# Patient Record
Sex: FEMALE | Race: WHITE | NOT HISPANIC OR LATINO | Employment: FULL TIME | URBAN - METROPOLITAN AREA
[De-identification: names, ages, dates, MRNs, and addresses within clinical notes are randomized per-mention and may not be internally consistent; named-entity substitution may affect disease eponyms.]

---

## 2017-06-08 ENCOUNTER — TELEPHONE (OUTPATIENT)
Dept: ORTHOPEDICS | Facility: CLINIC | Age: 47
End: 2017-06-08

## 2017-06-08 DIAGNOSIS — M54.2 CERVICAL SPINE PAIN: Primary | ICD-10-CM

## 2017-06-14 ENCOUNTER — OFFICE VISIT (OUTPATIENT)
Dept: ORTHOPEDICS | Facility: CLINIC | Age: 47
End: 2017-06-14

## 2017-06-14 ENCOUNTER — HOSPITAL ENCOUNTER (OUTPATIENT)
Dept: RADIOLOGY | Facility: HOSPITAL | Age: 47
Discharge: HOME | End: 2017-06-14
Attending: ORTHOPAEDIC SURGERY

## 2017-06-14 VITALS — HEIGHT: 60 IN | WEIGHT: 115.44 LBS | BODY MASS INDEX: 22.66 KG/M2

## 2017-06-14 DIAGNOSIS — M54.2 CERVICAL SPINE PAIN: ICD-10-CM

## 2017-06-14 DIAGNOSIS — M54.12 CERVICAL RADICULOPATHY: Primary | ICD-10-CM

## 2017-06-14 PROCEDURE — 72050 X-RAY EXAM NECK SPINE 4/5VWS: CPT | Mod: 26,,, | Performed by: RADIOLOGY

## 2017-06-14 PROCEDURE — 99204 OFFICE O/P NEW MOD 45 MIN: CPT | Mod: S$PBB,,, | Performed by: ORTHOPAEDIC SURGERY

## 2017-06-14 PROCEDURE — 99213 OFFICE O/P EST LOW 20 MIN: CPT | Mod: PBBFAC,25 | Performed by: ORTHOPAEDIC SURGERY

## 2017-06-14 PROCEDURE — 72050 X-RAY EXAM NECK SPINE 4/5VWS: CPT | Mod: TC

## 2017-06-14 PROCEDURE — 99999 PR PBB SHADOW E&M-EST. PATIENT-LVL III: CPT | Mod: PBBFAC,,, | Performed by: ORTHOPAEDIC SURGERY

## 2017-06-14 RX ORDER — TRAMADOL HYDROCHLORIDE 50 MG/1
50 TABLET ORAL EVERY 6 HOURS PRN
COMMUNITY
End: 2017-07-12

## 2017-06-14 RX ORDER — MULTIVITAMIN
1 TABLET ORAL DAILY
Status: ON HOLD | COMMUNITY
End: 2017-08-03 | Stop reason: HOSPADM

## 2017-06-14 RX ORDER — PROGESTERONE 200 MG/1
200 CAPSULE ORAL NIGHTLY
COMMUNITY

## 2017-06-16 NOTE — PROGRESS NOTES
DATE: 6/16/2017  PATIENT: Sammie Bee    Attending Physician: Kvng Whittaker M.D.    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 The pain has been present for about 3 years, she awoke one morning with a stiff neck and has been dealing with this ever since. The patient describes the pain as stabbing pain. The pain is worse with driving and improved by hot baths. There is no associated numbness and tingling. There is subjective weakness: specifically she reports difficulty with jars. Prior treatments have included NSAIDs, tramadol, dry needling, massage and chiropractic acare, but no injections or surgery.     The Patient denies myelopathic symptoms such as handwriting changes or difficulty with buttons/coins/keys. Denies perineal paresthesias, bowel/bladder dysfunction.    PAST MEDICAL/SURGICAL HISTORY:  History reviewed. No pertinent past medical history.  History reviewed. No pertinent surgical history.    Current Medications:   Current Outpatient Prescriptions:     ESTROGENS, CONJUGATED (PREMARIN ORAL), Take by mouth., Disp: , Rfl:     multivitamin (ONE DAILY MULTIVITAMIN) per tablet, Take 1 tablet by mouth once daily., Disp: , Rfl:     progesterone (PROMETRIUM) 200 MG capsule, Take 200 mg by mouth once daily., Disp: , Rfl:     tramadol (ULTRAM) 50 mg tablet, Take 50 mg by mouth every 6 (six) hours as needed for Pain., Disp: , Rfl:     Social History:   Social History     Social History    Marital status:      Spouse name: N/A    Number of children: N/A    Years of education: N/A     Occupational History    Not on file.     Social History Main Topics    Smoking status: Not on file    Smokeless tobacco: Not on file    Alcohol use Not on file    Drug use: Unknown    Sexual activity:  Not on file     Other Topics Concern    Not on file     Social History Narrative    No narrative on file       REVIEW OF SYSTEMS:  Constitution: Negative. Negative for chills, fever and night sweats.   Cardiovascular: Negative for chest pain and syncope.   Respiratory: Negative for cough and shortness of breath.   Gastrointestinal: See HPI. Negative for nausea/vomiting.   Genitourinary: See HPI. Negative for discoloration or dysuria.  Skin: Negative for dry skin, itching and rash.   Hematologic/Lymphatic: Negative for bleeding/clotting disorders.   Musculoskeletal: Negative for falls and muscle weakness.   Neurological: See HPI. + For headaches, no history of seizures. no history of cranial surgery or shunts.  Endocrine: Negative for polydipsia, polyphagia and polyuria.   Allergic/Immunologic: Negative for hives and persistent infections.  Psychiatric/Behavioral: Negative for depression and insomnia.         EXAM:  Ht 5' (1.524 m)   Wt 52.3 kg (115 lb 6.6 oz)   BMI 22.54 kg/m²     General: The patient is a very pleasant 47 y.o. female in no apparent distress, the patient is orientatied to person, place and time.  Psych: Normal mood and affect  HEENT: Vision grossly intact, hearing intact to the spoken word.  Lungs: Respirations unlabored.  Gait: Normal station and gait, no difficulty with toe or heel walk.   Skin: Cervical skin negative for rashes, lesions, hairy patches and surgical scars.  Range of motion: Cervical range of motion is acceptable. There is 2+ tenderness to palpation.  Spinal Balance: Global saggital and coronal spinal balance acceptable, no significant for scoliosis and kyphosis.  Musculoskeletal: No pain with the range of motion of the bilateral shoulders and elbows. Normal bulk and contour of the bilateral hands.  Vascular: Bilateral hands warm and well perfused, Radial pulses 2+ bilaterally.  Neurological: Normal strength and tone in all major motor groups in the bilateral upper and lower  extremities. Normal sensation to light touch in the C5-T1 and L2-S1 dermatomes bilaterally.  Deep tendon reflexes symmetric 2+ in the bilateral upper and lower extremities.  Positive Bilateral Inverted Radial Reflex and Negative Sampson's bilaterally. Negative Babinski bilaterally.     IMAGING:   Today I personally reviewed AP, Lat and Flex/Ex  upright C-spine films that demonstrate focal C5/6 spondylosis     ASSESSMENT/PLAN:  Cervical radiculopathy  -     MRI Cervical Spine Without Contrast; Future; Expected date: 06/14/2017      RTC to discuss options.

## 2017-06-23 ENCOUNTER — OFFICE VISIT (OUTPATIENT)
Dept: ORTHOPEDICS | Facility: CLINIC | Age: 47
End: 2017-06-23

## 2017-06-23 VITALS — WEIGHT: 102.63 LBS | BODY MASS INDEX: 20.15 KG/M2 | HEIGHT: 60 IN

## 2017-06-23 DIAGNOSIS — M54.12 CERVICAL RADICULOPATHY: Primary | ICD-10-CM

## 2017-06-23 PROCEDURE — 99213 OFFICE O/P EST LOW 20 MIN: CPT | Mod: S$PBB,,, | Performed by: ORTHOPAEDIC SURGERY

## 2017-06-23 PROCEDURE — 99999 PR PBB SHADOW E&M-EST. PATIENT-LVL III: CPT | Mod: PBBFAC,,, | Performed by: ORTHOPAEDIC SURGERY

## 2017-06-23 PROCEDURE — 99213 OFFICE O/P EST LOW 20 MIN: CPT | Mod: PBBFAC | Performed by: ORTHOPAEDIC SURGERY

## 2017-06-23 RX ORDER — HYDROCODONE BITARTRATE AND ACETAMINOPHEN 5; 325 MG/1; MG/1
1 TABLET ORAL NIGHTLY PRN
Qty: 60 TABLET | Refills: 0 | Status: SHIPPED | OUTPATIENT
Start: 2017-06-23 | End: 2017-07-10 | Stop reason: SDUPTHER

## 2017-06-23 RX ORDER — MUPIROCIN 20 MG/G
1 OINTMENT TOPICAL
Status: CANCELLED | OUTPATIENT
Start: 2017-06-23

## 2017-06-23 NOTE — PROGRESS NOTES
DATE: 6/23/2017  PATIENT: Sammie Bee    Attending Physician: Kvng Whittaker M.D.    Sammie Bee is a 47 y.o. female who returns to me today to follow up for neck and right shoulder/arm pain.  She has continued to have worsening of her pains and increasing right arm/hand weakness.  She had a MRI since her last visit which revealed a R sided disc bulge and significant R sided C5/6 stenosis.    We discussed her past treatments, which have included PT with dry needling and ESIs, all of which have failed to provide relief.    Given the progression of her symptoms and failure of conservative treatment, we will go ahead and pursue operative intervention.  We will plan on C5/6 arthroplasty versus possible ACDF on August 3, 2017.

## 2017-07-10 DIAGNOSIS — Z98.890 S/P SPINAL SURGERY: Primary | ICD-10-CM

## 2017-07-10 RX ORDER — HYDROCODONE BITARTRATE AND ACETAMINOPHEN 5; 325 MG/1; MG/1
1 TABLET ORAL NIGHTLY PRN
Qty: 60 TABLET | Refills: 0 | Status: SHIPPED | OUTPATIENT
Start: 2017-07-10 | End: 2017-07-12 | Stop reason: SDUPTHER

## 2017-07-12 DIAGNOSIS — Z98.890 S/P SPINAL SURGERY: ICD-10-CM

## 2017-07-12 RX ORDER — HYDROCODONE BITARTRATE AND ACETAMINOPHEN 5; 325 MG/1; MG/1
1 TABLET ORAL EVERY 6 HOURS PRN
Qty: 60 TABLET | Refills: 0 | Status: ON HOLD | OUTPATIENT
Start: 2017-07-12 | End: 2017-08-03 | Stop reason: HOSPADM

## 2017-07-20 DIAGNOSIS — Z98.890 S/P SPINAL SURGERY: Primary | ICD-10-CM

## 2017-07-26 ENCOUNTER — ANESTHESIA EVENT (OUTPATIENT)
Dept: SURGERY | Facility: HOSPITAL | Age: 47
End: 2017-07-26

## 2017-07-26 NOTE — ANESTHESIA PREPROCEDURE EVALUATION
Anesthesia Assessment: Preoperative EQUATION    Planned Procedure: Procedure(s) (LRB):  C5/6 Disc Replacement LDR SNS: Motors/SSEP/Vocal Cord Regular OR Table C-arm (N/A)  Requested Anesthesia Type:General  Surgeon: Kvng Whittaker MD  Service: Neurosurgery  Known or anticipated Date of Surgery:8/3/2017    Surgeon notes: reviewed  Triage considerations:     The patient has no apparent active cardiac condition (No unstable coronary Syndrome such as severe unstable angina or recent [<1 month] myocardial infarction, decompensated CHF, severe valvular   disease or significant arrhythmia)    Previous anesthesia records:Not available    Last PCP note: within 3 months , outside [facility]     Other important co-morbidities: none     Tests already available:  No recent tests.            Instructions given. (See in Nurse's note)    Optimization:  Anesthesia Preop Clinic Assessment not Indicated  Plan:    Testing:  Hematology Profile, BMP and PT/INR 7/31 complete              Straight Line to surgery.               No tests, anesthesia preop clinic visit, or consult required.          MOISE FOOTE 7/26/17 07/26/2017  Sammie Bee is a 47 y.o., female.    Anesthesia Evaluation    I have reviewed the Patient Summary Reports.    I have reviewed the Nursing Notes.   I have reviewed the Medications.     Review of Systems  Anesthesia Hx:  No problems with previous Anesthesia  History of prior surgery of interest to airway management or planning: Previous anesthesia: General  Denies Personal Hx of Anesthesia complications.   Cardiovascular:  Cardiovascular Normal     Pulmonary:  Pulmonary Normal    Renal/:  Renal/ Normal     Hepatic/GI:  Hepatic/GI Normal    Neurological:   Neuromuscular Disease,    Endocrine:  Endocrine Normal      Patient Active Problem List   Diagnosis    Cervical radiculopathy     History reviewed.  No pertinent past medical history.  History reviewed. No pertinent surgical history.      Physical Exam  General:  Well nourished    Airway/Jaw/Neck:  Airway Findings: Mouth Opening: Normal Tongue: Normal  General Airway Assessment: Adult  Mallampati: II  TM Distance: Normal, at least 6 cm  Jaw/Neck Findings:  Neck ROM: Normal ROM      Dental:  Dental Findings: In tact   Chest/Lungs:  Chest/Lungs Findings: Clear to auscultation     Heart/Vascular:  Heart Findings: Rate: Normal  Rhythm: Regular Rhythm  Sounds: Normal        Mental Status:  Mental Status Findings:  Cooperative, Alert and Oriented         Anesthesia Plan  Type of Anesthesia, risks & benefits discussed:  Anesthesia Type:  general  Patient's Preference: general  Intra-op Monitoring Plan: standard ASA monitors  Intra-op Monitoring Plan Comments:   Post Op Pain Control Plan: per primary service following discharge from PACU  Post Op Pain Control Plan Comments:   Induction:   IV  Beta Blocker:  Patient is not currently on a Beta-Blocker (No further documentation required).       Informed Consent: Patient understands risks and agrees with Anesthesia plan.  Questions answered. Anesthesia consent signed with patient.  ASA Score: 1     Day of Surgery Review of History & Physical:    H&P update referred to the surgeon.         Ready For Surgery From Anesthesia Perspective.

## 2017-07-26 NOTE — PRE ADMISSION SCREENING
Anesthesia Assessment: Preoperative EQUATION    Planned Procedure: Procedure(s) (LRB):  C5/6 Disc Replacement LDR SNS: Motors/SSEP/Vocal Cord Regular OR Table C-arm (N/A)  Requested Anesthesia Type:General  Surgeon: Kvng Whittaker MD  Service: Neurosurgery  Known or anticipated Date of Surgery:8/3/2017    Surgeon notes: reviewed  Triage considerations:     The patient has no apparent active cardiac condition (No unstable coronary Syndrome such as severe unstable angina or recent [<1 month] myocardial infarction, decompensated CHF, severe valvular   disease or significant arrhythmia)    Previous anesthesia records:Not available    Last PCP note: within 3 months , outside [facility]       Other important co-morbidities:      Tests already available:  No recent tests.            Instructions given. (See in Nurse's note)    Optimization:  Anesthesia Preop Clinic Assessment  Indicated  Plan:    Testing:  Hematology Profile, BMP and PT/INR 7/31              Straight Line to surgery.               No tests(7/31), anesthesia preop clinic visit, or consult required.

## 2017-07-31 ENCOUNTER — OFFICE VISIT (OUTPATIENT)
Dept: SPINE | Facility: CLINIC | Age: 47
End: 2017-07-31
Attending: ORTHOPAEDIC SURGERY

## 2017-07-31 ENCOUNTER — LAB VISIT (OUTPATIENT)
Dept: LAB | Facility: HOSPITAL | Age: 47
End: 2017-07-31
Attending: ANESTHESIOLOGY

## 2017-07-31 DIAGNOSIS — M54.12 CERVICAL RADICULOPATHY: ICD-10-CM

## 2017-07-31 DIAGNOSIS — M50.30 DDD (DEGENERATIVE DISC DISEASE), CERVICAL: Primary | ICD-10-CM

## 2017-07-31 DIAGNOSIS — M79.602 PAIN IN BOTH UPPER EXTREMITIES: Primary | ICD-10-CM

## 2017-07-31 DIAGNOSIS — M79.602 PAIN IN BOTH UPPER EXTREMITIES: ICD-10-CM

## 2017-07-31 DIAGNOSIS — M79.601 PAIN IN BOTH UPPER EXTREMITIES: ICD-10-CM

## 2017-07-31 DIAGNOSIS — M79.601 PAIN IN BOTH UPPER EXTREMITIES: Primary | ICD-10-CM

## 2017-07-31 LAB
ANION GAP SERPL CALC-SCNC: 11 MMOL/L
BUN SERPL-MCNC: 5 MG/DL
CALCIUM SERPL-MCNC: 9.2 MG/DL
CHLORIDE SERPL-SCNC: 105 MMOL/L
CO2 SERPL-SCNC: 24 MMOL/L
CREAT SERPL-MCNC: 0.7 MG/DL
ERYTHROCYTE [DISTWIDTH] IN BLOOD BY AUTOMATED COUNT: 13.5 %
EST. GFR  (AFRICAN AMERICAN): >60 ML/MIN/1.73 M^2
EST. GFR  (NON AFRICAN AMERICAN): >60 ML/MIN/1.73 M^2
GLUCOSE SERPL-MCNC: 110 MG/DL
HCT VFR BLD AUTO: 38.2 %
HGB BLD-MCNC: 12.6 G/DL
INR PPP: 0.9
MCH RBC QN AUTO: 29.4 PG
MCHC RBC AUTO-ENTMCNC: 33 G/DL
MCV RBC AUTO: 89 FL
PLATELET # BLD AUTO: 336 K/UL
PMV BLD AUTO: 9.3 FL
POTASSIUM SERPL-SCNC: 4.5 MMOL/L
PROTHROMBIN TIME: 9.9 SEC
RBC # BLD AUTO: 4.28 M/UL
SODIUM SERPL-SCNC: 140 MMOL/L
WBC # BLD AUTO: 5.63 K/UL

## 2017-07-31 PROCEDURE — 80048 BASIC METABOLIC PNL TOTAL CA: CPT

## 2017-07-31 PROCEDURE — 85610 PROTHROMBIN TIME: CPT

## 2017-07-31 PROCEDURE — 36415 COLL VENOUS BLD VENIPUNCTURE: CPT

## 2017-07-31 PROCEDURE — 99999 PR PBB SHADOW E&M-EST. PATIENT-LVL II: CPT | Mod: PBBFAC,,, | Performed by: ORTHOPAEDIC SURGERY

## 2017-07-31 PROCEDURE — 99212 OFFICE O/P EST SF 10 MIN: CPT | Mod: PBBFAC | Performed by: ORTHOPAEDIC SURGERY

## 2017-07-31 PROCEDURE — 99213 OFFICE O/P EST LOW 20 MIN: CPT | Mod: S$PBB,,, | Performed by: ORTHOPAEDIC SURGERY

## 2017-07-31 PROCEDURE — 85027 COMPLETE CBC AUTOMATED: CPT

## 2017-07-31 RX ORDER — CHLORHEXIDINE GLUCONATE 40 MG/ML
SOLUTION TOPICAL DAILY PRN
Qty: 118 ML | Refills: 0 | Status: ON HOLD | OUTPATIENT
Start: 2017-07-31 | End: 2017-08-03 | Stop reason: HOSPADM

## 2017-07-31 NOTE — PROGRESS NOTES
The patient returns for preop.    She has a long history of refractory R arm, R periscapular and neck pain.    We are planning a C5/6 disc arthroplasty.    I had a sit down discussion with the patient her  regarding a C5/6 disc arthroplasty. We specifically discussed the risks, benefits, and alternatives to surgery. We discussed the surgical procedure including the skin incision, nerve decompression, and arthroplasty.  They understand the risks include but are not limited to death, paralysis, blindness, bleeding, infection, damage to arteries, veins and nerves, tracheal injury, esophageal injury, vertebral artery injury, dysphagia, spinal fluid leak, continued or worsening pain, no improvement in symptoms, failure of prosthesis, and the possible need for more surgery in the future, as well as the possibility other unforseen and unknown complications. We talked about expected hospital stay and recovery period. I discussed the possibility of intraoperative conversion to an ACDF if there is significant endplate violation or difficulty with the prosthesis. All questions were answered; they understand and wish to proceed.       I spent 15 minutes with the patient of which greater than 1/2 the time was devoted to counciling the patient discussing surgery.

## 2017-08-03 ENCOUNTER — SURGERY (OUTPATIENT)
Age: 47
End: 2017-08-03

## 2017-08-03 ENCOUNTER — HOSPITAL ENCOUNTER (OUTPATIENT)
Facility: HOSPITAL | Age: 47
LOS: 1 days | Discharge: HOME | End: 2017-08-04
Attending: ORTHOPAEDIC SURGERY | Admitting: ORTHOPAEDIC SURGERY

## 2017-08-03 ENCOUNTER — ANESTHESIA (OUTPATIENT)
Dept: SURGERY | Facility: HOSPITAL | Age: 47
End: 2017-08-03

## 2017-08-03 DIAGNOSIS — M54.12 CERVICAL RADICULOPATHY: Primary | ICD-10-CM

## 2017-08-03 PROCEDURE — 71000039 HC RECOVERY, EACH ADD'L HOUR: Performed by: ORTHOPAEDIC SURGERY

## 2017-08-03 PROCEDURE — 25000003 PHARM REV CODE 250: Performed by: ORTHOPAEDIC SURGERY

## 2017-08-03 PROCEDURE — 25000003 PHARM REV CODE 250: Performed by: NURSE ANESTHETIST, CERTIFIED REGISTERED

## 2017-08-03 PROCEDURE — 25000003 PHARM REV CODE 250

## 2017-08-03 PROCEDURE — 63600175 PHARM REV CODE 636 W HCPCS: Performed by: STUDENT IN AN ORGANIZED HEALTH CARE EDUCATION/TRAINING PROGRAM

## 2017-08-03 PROCEDURE — D9220A PRA ANESTHESIA: Mod: ,,, | Performed by: ANESTHESIOLOGY

## 2017-08-03 PROCEDURE — 63600175 PHARM REV CODE 636 W HCPCS

## 2017-08-03 PROCEDURE — 22856 TOT DISC ARTHRP 1NTRSPC CRV: CPT | Mod: ,,, | Performed by: ORTHOPAEDIC SURGERY

## 2017-08-03 PROCEDURE — 71000033 HC RECOVERY, INTIAL HOUR: Performed by: ORTHOPAEDIC SURGERY

## 2017-08-03 PROCEDURE — C1713 ANCHOR/SCREW BN/BN,TIS/BN: HCPCS | Performed by: ORTHOPAEDIC SURGERY

## 2017-08-03 PROCEDURE — 25000003 PHARM REV CODE 250: Performed by: STUDENT IN AN ORGANIZED HEALTH CARE EDUCATION/TRAINING PROGRAM

## 2017-08-03 PROCEDURE — 36000710: Performed by: ORTHOPAEDIC SURGERY

## 2017-08-03 PROCEDURE — C1729 CATH, DRAINAGE: HCPCS | Performed by: ORTHOPAEDIC SURGERY

## 2017-08-03 PROCEDURE — 36000711: Performed by: ORTHOPAEDIC SURGERY

## 2017-08-03 PROCEDURE — 94760 N-INVAS EAR/PLS OXIMETRY 1: CPT

## 2017-08-03 PROCEDURE — 63600175 PHARM REV CODE 636 W HCPCS: Performed by: NURSE ANESTHETIST, CERTIFIED REGISTERED

## 2017-08-03 PROCEDURE — 20600001 HC STEP DOWN PRIVATE ROOM

## 2017-08-03 PROCEDURE — 37000008 HC ANESTHESIA 1ST 15 MINUTES: Performed by: ORTHOPAEDIC SURGERY

## 2017-08-03 PROCEDURE — 27201423 OPTIME MED/SURG SUP & DEVICES STERILE SUPPLY: Performed by: ORTHOPAEDIC SURGERY

## 2017-08-03 PROCEDURE — 63600175 PHARM REV CODE 636 W HCPCS: Performed by: ORTHOPAEDIC SURGERY

## 2017-08-03 PROCEDURE — 37000009 HC ANESTHESIA EA ADD 15 MINS: Performed by: ORTHOPAEDIC SURGERY

## 2017-08-03 DEVICE — DISC CERVICAL MOBI-C 13X15X5MM: Type: IMPLANTABLE DEVICE | Site: SPINE CERVICAL | Status: FUNCTIONAL

## 2017-08-03 RX ORDER — INDOMETHACIN 25 MG/1
25 CAPSULE ORAL
Status: DISCONTINUED | OUTPATIENT
Start: 2017-08-03 | End: 2017-08-03

## 2017-08-03 RX ORDER — HALOPERIDOL 5 MG/ML
0.5 INJECTION INTRAMUSCULAR
Status: ACTIVE | OUTPATIENT
Start: 2017-08-03 | End: 2017-08-03

## 2017-08-03 RX ORDER — MORPHINE SULFATE 2 MG/ML
2 INJECTION, SOLUTION INTRAMUSCULAR; INTRAVENOUS
Status: DISCONTINUED | OUTPATIENT
Start: 2017-08-03 | End: 2017-08-04 | Stop reason: HOSPADM

## 2017-08-03 RX ORDER — DIPHENHYDRAMINE HCL 25 MG
25 CAPSULE ORAL EVERY 6 HOURS PRN
Status: DISCONTINUED | OUTPATIENT
Start: 2017-08-03 | End: 2017-08-04 | Stop reason: HOSPADM

## 2017-08-03 RX ORDER — DOCUSATE SODIUM 100 MG/1
100 CAPSULE, LIQUID FILLED ORAL 2 TIMES DAILY
Qty: 60 CAPSULE | Refills: 0 | Status: ON HOLD | OUTPATIENT
Start: 2017-08-03 | End: 2020-03-16 | Stop reason: CLARIF

## 2017-08-03 RX ORDER — ONDANSETRON 4 MG/1
4 TABLET, FILM COATED ORAL EVERY 6 HOURS PRN
Status: DISCONTINUED | OUTPATIENT
Start: 2017-08-03 | End: 2017-08-04 | Stop reason: HOSPADM

## 2017-08-03 RX ORDER — SUCCINYLCHOLINE CHLORIDE 20 MG/ML
INJECTION INTRAMUSCULAR; INTRAVENOUS
Status: DISCONTINUED | OUTPATIENT
Start: 2017-08-03 | End: 2017-08-03

## 2017-08-03 RX ORDER — PROPOFOL 10 MG/ML
VIAL (ML) INTRAVENOUS CONTINUOUS PRN
Status: DISCONTINUED | OUTPATIENT
Start: 2017-08-03 | End: 2017-08-03

## 2017-08-03 RX ORDER — OXYCODONE AND ACETAMINOPHEN 5; 325 MG/1; MG/1
1 TABLET ORAL EVERY 4 HOURS PRN
Status: DISCONTINUED | OUTPATIENT
Start: 2017-08-03 | End: 2017-08-04 | Stop reason: HOSPADM

## 2017-08-03 RX ORDER — CEFAZOLIN SODIUM 1 G/3ML
INJECTION, POWDER, FOR SOLUTION INTRAMUSCULAR; INTRAVENOUS
Status: DISCONTINUED | OUTPATIENT
Start: 2017-08-03 | End: 2017-08-03

## 2017-08-03 RX ORDER — PROPOFOL 10 MG/ML
VIAL (ML) INTRAVENOUS
Status: DISCONTINUED | OUTPATIENT
Start: 2017-08-03 | End: 2017-08-03

## 2017-08-03 RX ORDER — HYDROMORPHONE HYDROCHLORIDE 1 MG/ML
INJECTION, SOLUTION INTRAMUSCULAR; INTRAVENOUS; SUBCUTANEOUS
Status: COMPLETED
Start: 2017-08-03 | End: 2017-08-03

## 2017-08-03 RX ORDER — INDOMETHACIN 25 MG/1
25 CAPSULE ORAL 3 TIMES DAILY
Qty: 21 CAPSULE | Refills: 0 | Status: SHIPPED | OUTPATIENT
Start: 2017-08-03 | End: 2017-08-16

## 2017-08-03 RX ORDER — CEFAZOLIN SODIUM 1 G/50ML
1 SOLUTION INTRAVENOUS
Status: COMPLETED | OUTPATIENT
Start: 2017-08-03 | End: 2017-08-04

## 2017-08-03 RX ORDER — HYDROMORPHONE HYDROCHLORIDE 1 MG/ML
0.2 INJECTION, SOLUTION INTRAMUSCULAR; INTRAVENOUS; SUBCUTANEOUS EVERY 5 MIN PRN
Status: DISCONTINUED | OUTPATIENT
Start: 2017-08-03 | End: 2017-08-03 | Stop reason: HOSPADM

## 2017-08-03 RX ORDER — INDOMETHACIN 25 MG/1
25 CAPSULE ORAL
Status: DISCONTINUED | OUTPATIENT
Start: 2017-08-04 | End: 2017-08-04 | Stop reason: HOSPADM

## 2017-08-03 RX ORDER — OXYCODONE AND ACETAMINOPHEN 10; 325 MG/1; MG/1
TABLET ORAL
Status: COMPLETED
Start: 2017-08-03 | End: 2017-08-03

## 2017-08-03 RX ORDER — PROMETHAZINE HYDROCHLORIDE 12.5 MG/1
12.5 TABLET ORAL EVERY 6 HOURS PRN
Status: DISCONTINUED | OUTPATIENT
Start: 2017-08-03 | End: 2017-08-04 | Stop reason: HOSPADM

## 2017-08-03 RX ORDER — SODIUM CHLORIDE 9 MG/ML
INJECTION, SOLUTION INTRAVENOUS CONTINUOUS
Status: ACTIVE | OUTPATIENT
Start: 2017-08-03 | End: 2017-08-04

## 2017-08-03 RX ORDER — BACITRACIN 50000 [IU]/1
INJECTION, POWDER, FOR SOLUTION INTRAMUSCULAR
Status: DISCONTINUED | OUTPATIENT
Start: 2017-08-03 | End: 2017-08-03 | Stop reason: HOSPADM

## 2017-08-03 RX ORDER — OXYCODONE AND ACETAMINOPHEN 10; 325 MG/1; MG/1
1 TABLET ORAL EVERY 4 HOURS PRN
Status: DISCONTINUED | OUTPATIENT
Start: 2017-08-03 | End: 2017-08-04 | Stop reason: HOSPADM

## 2017-08-03 RX ORDER — LIDOCAINE HCL/PF 100 MG/5ML
SYRINGE (ML) INTRAVENOUS
Status: DISCONTINUED | OUTPATIENT
Start: 2017-08-03 | End: 2017-08-03

## 2017-08-03 RX ORDER — RAMELTEON 8 MG/1
8 TABLET ORAL NIGHTLY PRN
Status: DISCONTINUED | OUTPATIENT
Start: 2017-08-03 | End: 2017-08-04 | Stop reason: HOSPADM

## 2017-08-03 RX ORDER — ROCURONIUM BROMIDE 10 MG/ML
INJECTION, SOLUTION INTRAVENOUS
Status: DISCONTINUED | OUTPATIENT
Start: 2017-08-03 | End: 2017-08-03

## 2017-08-03 RX ORDER — DOCUSATE SODIUM 50 MG/5ML
100 LIQUID ORAL DAILY
Status: DISCONTINUED | OUTPATIENT
Start: 2017-08-03 | End: 2017-08-04 | Stop reason: HOSPADM

## 2017-08-03 RX ORDER — ONDANSETRON 2 MG/ML
INJECTION INTRAMUSCULAR; INTRAVENOUS
Status: DISCONTINUED | OUTPATIENT
Start: 2017-08-03 | End: 2017-08-03

## 2017-08-03 RX ORDER — KETAMINE HCL IN 0.9 % NACL 50 MG/5 ML
SYRINGE (ML) INTRAVENOUS
Status: DISCONTINUED | OUTPATIENT
Start: 2017-08-03 | End: 2017-08-03

## 2017-08-03 RX ORDER — DEXAMETHASONE SODIUM PHOSPHATE 4 MG/ML
INJECTION, SOLUTION INTRA-ARTICULAR; INTRALESIONAL; INTRAMUSCULAR; INTRAVENOUS; SOFT TISSUE
Status: DISCONTINUED | OUTPATIENT
Start: 2017-08-03 | End: 2017-08-03

## 2017-08-03 RX ORDER — OXYCODONE AND ACETAMINOPHEN 5; 325 MG/1; MG/1
1 TABLET ORAL EVERY 4 HOURS PRN
Qty: 90 TABLET | Refills: 0 | Status: SHIPPED | OUTPATIENT
Start: 2017-08-03 | End: 2017-08-10

## 2017-08-03 RX ORDER — LORAZEPAM 2 MG/ML
0.25 INJECTION INTRAMUSCULAR
Status: DISCONTINUED | OUTPATIENT
Start: 2017-08-03 | End: 2017-08-04 | Stop reason: HOSPADM

## 2017-08-03 RX ORDER — LIDOCAINE HYDROCHLORIDE AND EPINEPHRINE 10; 10 MG/ML; UG/ML
INJECTION, SOLUTION INFILTRATION; PERINEURAL
Status: DISCONTINUED | OUTPATIENT
Start: 2017-08-03 | End: 2017-08-03 | Stop reason: HOSPADM

## 2017-08-03 RX ORDER — SODIUM CHLORIDE 9 MG/ML
INJECTION, SOLUTION INTRAVENOUS CONTINUOUS
Status: DISCONTINUED | OUTPATIENT
Start: 2017-08-03 | End: 2017-08-03

## 2017-08-03 RX ORDER — LIDOCAINE HYDROCHLORIDE 10 MG/ML
1 INJECTION, SOLUTION EPIDURAL; INFILTRATION; INTRACAUDAL; PERINEURAL ONCE
Status: COMPLETED | OUTPATIENT
Start: 2017-08-03 | End: 2017-08-03

## 2017-08-03 RX ORDER — FENTANYL CITRATE 50 UG/ML
INJECTION, SOLUTION INTRAMUSCULAR; INTRAVENOUS
Status: DISCONTINUED | OUTPATIENT
Start: 2017-08-03 | End: 2017-08-03

## 2017-08-03 RX ORDER — ONDANSETRON HYDROCHLORIDE 8 MG/1
8 TABLET, FILM COATED ORAL EVERY 8 HOURS PRN
Qty: 30 TABLET | Refills: 0 | Status: SHIPPED | OUTPATIENT
Start: 2017-08-03 | End: 2018-06-04

## 2017-08-03 RX ORDER — SODIUM CHLORIDE 0.9 % (FLUSH) 0.9 %
3 SYRINGE (ML) INJECTION
Status: DISCONTINUED | OUTPATIENT
Start: 2017-08-03 | End: 2017-08-03 | Stop reason: HOSPADM

## 2017-08-03 RX ORDER — MIDAZOLAM HYDROCHLORIDE 1 MG/ML
INJECTION, SOLUTION INTRAMUSCULAR; INTRAVENOUS
Status: DISCONTINUED | OUTPATIENT
Start: 2017-08-03 | End: 2017-08-03

## 2017-08-03 RX ORDER — HYDROMORPHONE HYDROCHLORIDE 2 MG/ML
INJECTION, SOLUTION INTRAMUSCULAR; INTRAVENOUS; SUBCUTANEOUS
Status: DISCONTINUED | OUTPATIENT
Start: 2017-08-03 | End: 2017-08-03

## 2017-08-03 RX ORDER — MUPIROCIN 20 MG/G
1 OINTMENT TOPICAL
Status: COMPLETED | OUTPATIENT
Start: 2017-08-03 | End: 2017-08-03

## 2017-08-03 RX ORDER — DEXAMETHASONE SODIUM PHOSPHATE 4 MG/ML
10 INJECTION, SOLUTION INTRA-ARTICULAR; INTRALESIONAL; INTRAMUSCULAR; INTRAVENOUS; SOFT TISSUE EVERY 8 HOURS
Status: DISCONTINUED | OUTPATIENT
Start: 2017-08-03 | End: 2017-08-04 | Stop reason: HOSPADM

## 2017-08-03 RX ADMIN — LIDOCAINE HYDROCHLORIDE 2 MG: 10 INJECTION, SOLUTION EPIDURAL; INFILTRATION; INTRACAUDAL; PERINEURAL at 08:08

## 2017-08-03 RX ADMIN — PROPOFOL 150 MG: 10 INJECTION, EMULSION INTRAVENOUS at 11:08

## 2017-08-03 RX ADMIN — MUPIROCIN 1 G: 20 OINTMENT TOPICAL at 08:08

## 2017-08-03 RX ADMIN — SODIUM CHLORIDE: 0.9 INJECTION, SOLUTION INTRAVENOUS at 02:08

## 2017-08-03 RX ADMIN — HYDROMORPHONE HYDROCHLORIDE 0.2 MG: 1 INJECTION, SOLUTION INTRAMUSCULAR; INTRAVENOUS; SUBCUTANEOUS at 02:08

## 2017-08-03 RX ADMIN — HYDROMORPHONE HYDROCHLORIDE 0.5 MG: 2 INJECTION, SOLUTION INTRAMUSCULAR; INTRAVENOUS; SUBCUTANEOUS at 01:08

## 2017-08-03 RX ADMIN — SODIUM CHLORIDE: 0.9 INJECTION, SOLUTION INTRAVENOUS at 08:08

## 2017-08-03 RX ADMIN — DEXAMETHASONE SODIUM PHOSPHATE 8 MG: 4 INJECTION, SOLUTION INTRAMUSCULAR; INTRAVENOUS at 12:08

## 2017-08-03 RX ADMIN — OXYCODONE HYDROCHLORIDE AND ACETAMINOPHEN 1 TABLET: 10; 325 TABLET ORAL at 08:08

## 2017-08-03 RX ADMIN — MIDAZOLAM HYDROCHLORIDE 2 MG: 1 INJECTION, SOLUTION INTRAMUSCULAR; INTRAVENOUS at 11:08

## 2017-08-03 RX ADMIN — ROCURONIUM BROMIDE 5 MG: 10 INJECTION, SOLUTION INTRAVENOUS at 11:08

## 2017-08-03 RX ADMIN — LORAZEPAM 0.25 MG: 2 INJECTION INTRAMUSCULAR; INTRAVENOUS at 02:08

## 2017-08-03 RX ADMIN — OXYCODONE HYDROCHLORIDE AND ACETAMINOPHEN 1 TABLET: 10; 325 TABLET ORAL at 01:08

## 2017-08-03 RX ADMIN — Medication 1 EACH: at 12:08

## 2017-08-03 RX ADMIN — ONDANSETRON 4 MG: 2 INJECTION INTRAMUSCULAR; INTRAVENOUS at 01:08

## 2017-08-03 RX ADMIN — BACITRACIN 50000 UNITS: 50000 INJECTION, POWDER, LYOPHILIZED, FOR SOLUTION INTRAMUSCULAR at 12:08

## 2017-08-03 RX ADMIN — MORPHINE SULFATE 2 MG: 2 INJECTION, SOLUTION INTRAMUSCULAR; INTRAVENOUS at 10:08

## 2017-08-03 RX ADMIN — Medication 10 MG: at 12:08

## 2017-08-03 RX ADMIN — DOCUSATE SODIUM 100 MG: 50 LIQUID ORAL at 03:08

## 2017-08-03 RX ADMIN — CEFAZOLIN 1 G: 1 INJECTION, POWDER, FOR SOLUTION INTRAVENOUS at 11:08

## 2017-08-03 RX ADMIN — FENTANYL CITRATE 100 MCG: 50 INJECTION, SOLUTION INTRAMUSCULAR; INTRAVENOUS at 11:08

## 2017-08-03 RX ADMIN — LIDOCAINE HYDROCHLORIDE AND EPINEPHRINE 4 ML: 10; 10 INJECTION, SOLUTION INFILTRATION; PERINEURAL at 12:08

## 2017-08-03 RX ADMIN — CEFAZOLIN SODIUM 1 G: 1 SOLUTION INTRAVENOUS at 08:08

## 2017-08-03 RX ADMIN — Medication 10 MG: at 01:08

## 2017-08-03 RX ADMIN — Medication 20 MG: at 11:08

## 2017-08-03 RX ADMIN — SODIUM CHLORIDE, SODIUM GLUCONATE, SODIUM ACETATE, POTASSIUM CHLORIDE, MAGNESIUM CHLORIDE, SODIUM PHOSPHATE, DIBASIC, AND POTASSIUM PHOSPHATE: .53; .5; .37; .037; .03; .012; .00082 INJECTION, SOLUTION INTRAVENOUS at 12:08

## 2017-08-03 RX ADMIN — LIDOCAINE HYDROCHLORIDE 70 MG: 20 INJECTION, SOLUTION INTRAVENOUS at 11:08

## 2017-08-03 RX ADMIN — REMIFENTANIL HYDROCHLORIDE 0.2 MCG: 1 INJECTION, POWDER, LYOPHILIZED, FOR SOLUTION INTRAVENOUS at 11:08

## 2017-08-03 RX ADMIN — SUCCINYLCHOLINE CHLORIDE 100 MG: 20 INJECTION, SOLUTION INTRAMUSCULAR; INTRAVENOUS at 11:08

## 2017-08-03 RX ADMIN — PROPOFOL 150 MCG/KG/MIN: 10 INJECTION, EMULSION INTRAVENOUS at 11:08

## 2017-08-03 RX ADMIN — DEXAMETHASONE SODIUM PHOSPHATE 10 MG: 4 INJECTION, SOLUTION INTRAMUSCULAR; INTRAVENOUS at 08:08

## 2017-08-03 RX ADMIN — MORPHINE SULFATE 2 MG: 2 INJECTION, SOLUTION INTRAMUSCULAR; INTRAVENOUS at 06:08

## 2017-08-03 RX ADMIN — THROMBIN, TOPICAL (BOVINE) 5000 UNITS: KIT at 12:08

## 2017-08-03 NOTE — NURSING TRANSFER
Nursing Transfer Note      8/3/2017     Transfer 1004    Transfer via stretcher    Transfer with iv pole, scds  Transported by tech    Medicines sent: colace liquid    Chart send with patient: yes    Notified:     Patient reassessed at: 8/3 @ 8662

## 2017-08-03 NOTE — H&P
DATE: 6/16/2017  PATIENT: Sammie Bee     Attending Physician: Kvng Whittaker M.D.     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 The pain has been present for about 3 years, she awoke one morning with a stiff neck and has been dealing with this ever since. The patient describes the pain as stabbing pain. The pain is worse with driving and improved by hot baths. There is no associated numbness and tingling. There is subjective weakness: specifically she reports difficulty with jars. Prior treatments have included NSAIDs, tramadol, dry needling, massage and chiropractic acare, but no injections or surgery.      The Patient denies myelopathic symptoms such as handwriting changes or difficulty with buttons/coins/keys. Denies perineal paresthesias, bowel/bladder dysfunction.     PAST MEDICAL/SURGICAL HISTORY:  History reviewed. No pertinent past medical history.  History reviewed. No pertinent surgical history.     Current Medications:   Current Outpatient Prescriptions:     ESTROGENS, CONJUGATED (PREMARIN ORAL), Take by mouth., Disp: , Rfl:     multivitamin (ONE DAILY MULTIVITAMIN) per tablet, Take 1 tablet by mouth once daily., Disp: , Rfl:     progesterone (PROMETRIUM) 200 MG capsule, Take 200 mg by mouth once daily., Disp: , Rfl:     tramadol (ULTRAM) 50 mg tablet, Take 50 mg by mouth every 6 (six) hours as needed for Pain., Disp: , Rfl:      Social History:   Social History   Social History            Social History    Marital status:        Spouse name: N/A    Number of children: N/A    Years of education: N/A          Occupational History    Not on file.           Social History Main Topics    Smoking status: Not on file    Smokeless tobacco: Not on file    Alcohol use Not on  file    Drug use: Unknown    Sexual activity: Not on file           Other Topics Concern    Not on file          Social History Narrative    No narrative on file            REVIEW OF SYSTEMS:  Constitution: Negative. Negative for chills, fever and night sweats.   Cardiovascular: Negative for chest pain and syncope.   Respiratory: Negative for cough and shortness of breath.   Gastrointestinal: See HPI. Negative for nausea/vomiting.   Genitourinary: See HPI. Negative for discoloration or dysuria.  Skin: Negative for dry skin, itching and rash.   Hematologic/Lymphatic: Negative for bleeding/clotting disorders.   Musculoskeletal: Negative for falls and muscle weakness.   Neurological: See HPI. + For headaches, no history of seizures. no history of cranial surgery or shunts.  Endocrine: Negative for polydipsia, polyphagia and polyuria.   Allergic/Immunologic: Negative for hives and persistent infections.  Psychiatric/Behavioral: Negative for depression and insomnia.          EXAM:  Ht 5' (1.524 m)   Wt 52.3 kg (115 lb 6.6 oz)   BMI 22.54 kg/m²      General: The patient is a very pleasant 47 y.o. female in no apparent distress, the patient is orientatied to person, place and time.  Psych: Normal mood and affect  HEENT: Vision grossly intact, hearing intact to the spoken word.  Lungs: Respirations unlabored.  Gait: Normal station and gait, no difficulty with toe or heel walk.   Skin: Cervical skin negative for rashes, lesions, hairy patches and surgical scars.  Range of motion: Cervical range of motion is acceptable. There is 2+ tenderness to palpation.  Spinal Balance: Global saggital and coronal spinal balance acceptable, no significant for scoliosis and kyphosis.  Musculoskeletal: No pain with the range of motion of the bilateral shoulders and elbows. Normal bulk and contour of the bilateral hands.  Vascular: Bilateral hands warm and well perfused, Radial pulses 2+ bilaterally.  Neurological: Normal strength and  tone in all major motor groups in the bilateral upper and lower extremities. Normal sensation to light touch in the C5-T1 and L2-S1 dermatomes bilaterally.  Deep tendon reflexes symmetric 2+ in the bilateral upper and lower extremities.  Positive Bilateral Inverted Radial Reflex and Negative Sampson's bilaterally. Negative Babinski bilaterally.      IMAGING:   Today I personally reviewed AP, Lat and Flex/Ex  upright C-spine films that demonstrate focal C5/6 spondylosis      ASSESSMENT/PLAN:  Cervical radiculopathy  1) C5/6 disc replacement today.

## 2017-08-03 NOTE — TRANSFER OF CARE
Anesthesia Transfer of Care Note    Patient: Sammie Bee    Procedure(s) Performed: Procedure(s) (LRB):  C5/6 Disc Replacement LDR SNS: Motors/SSEP/Vocal Cord Regular OR Table C-arm (N/A)    Patient location: PACU    Anesthesia Type: general    Transport from OR: Transported from OR on room air with adequate spontaneous ventilation    Post pain: adequate analgesia    Post assessment: no apparent anesthetic complications and tolerated procedure well    Post vital signs: stable    Level of consciousness: awake and alert    Nausea/Vomiting: no vomiting    Complications: none    Transfer of care protocol was followed      Last vitals:   Visit Vitals  /78   Pulse 85   Temp 36.6 °C (97.8 °F) (Oral)   Resp 12   Ht 5' (1.524 m)   Wt 50.3 kg (111 lb)   SpO2 96%   Breastfeeding? No   BMI 21.68 kg/m²

## 2017-08-03 NOTE — OP NOTE
DATE OF PROCEDURE: 08/033/2017     SURGEON: Kvng Whittaker M.D.     FIRST ASSISTANT SURGEON: Vania Stephens MD. PGY-2     PREOPERATIVE DIAGNOSIS: Right cervical radiculopathy secondary to C5-C6 disk herniation.     POSTOPERATIVE DIAGNOSIS: Right cervical radiculopathy secondary to C5-C6 disk herniation.     PROCEDURES PERFORMED: Total disk arthroplasty at the cervical 5-6 level   including decompression neurological elements.     ANESTHESIA: General endotracheal anesthesia.     ESTIMATED BLOOD LOSS: 50 mL.     IMPLANTS: LDR Mobi-C, 15 x 13 x 5 mm implant.     SPECIMENS: None.     FINDINGS: See op note.     COMPLICATIONS: None.     SPONGE AND NEEDLE COUNT: Correct x2.     DRAINS: One deep.     NEUROLOGICAL MONITORING: Motor evoked potentials, somatosensory evoked   potential free-running EMG as well as recurrent laryngeal nerve monitoring. . There were no changeto pre-incisional motor or somatosensory evoked potential baselines.     REASON FOR OPERATION AND BRIEF HISTORY AND PHYSICAL: The patient is a   47 year-old female with a C5/6 disc herniation and refractory right upper extremity radiculopathy. She has failed extensive nonoperative management, is developing myopathic symptoms. She is here for surgery today.     DESCRIPTION OF INFORMED CONSENT: Please see my last clinic note for full   description of the informed consent process I have with the patient.     DESCRIPTION OF PROCEDURE: The patient was met in the preoperative area, where   her right-sided neck was marked as the operative site. Subsequently, she was   brought to the Operating Room, where she was placed under general endotracheal   anesthesia. This was done with NIMS recurrent laryngeal monitoring tube. At   this point, a shoulder roll was placed behind the patient's scapula and the neck  was gently hyperextended to facilitate intraoperative visualization. At this   point, the patient's anterior cervical spine was prepped and draped in normal    sterile fashion. A full timeout was then called identifying the patient, the   procedural site and levels, the availability of all instruments and implants,   and no specific nursing, surgical, anesthetic or neurological monitoring   concerns. Finding that it was safe to proceed with surgery, I infiltrated my   planned skin incision with 6 mL of 1% Marcaine with epinephrine.     I then made a transverse right-sided anterior cervical incision and performed a   standard Schaefer See approach the cervical spine. I then identified the anterior aspect of the spine, placed a marker what I took to be the C6 vertebral body, took a lateral   radiograph and thus confirmed this to be true. I then completed my   subperiosteal exposure from the inferior half of the C5 to the superior half of   the C6 vertebra including from uncinate process to uncinate process. I then   performed a subtotal diskectomy under loupe magnification using a disk knife   followed by straight curettes, pituitary rongeurs and Kerrison punches. [place]   pins were then placed and the disk was parallel distracted. This identified the  posterior longitudinal ligament. Next, the Operating Room microscope was   brought in. I performed a PLL takedown under microscopic visualization. Please  note that the multiple ruptured disk fragments were found bilaterally in the left lateral recess consistent with preoperative imaging and the patient's symptoms. At the conclusion of   my neurological decompression, I could see dura from uncinate process to   uncinate process with no residual disk fragments palpated with a nerve hook. I   then thoroughly irrigated the disk space and achieved hemostasis with FloSeal   and patties. At this point, I sized the defect to fit a size 15 x 13 x 5 mm   implant. AP and lateral radiographs were taken, demonstrating correct level as   well as adequate position of the trial implant. The final implant was then   opened and gently  impacted into place. Final radiographs were taken in the AP   and lateral planes after gently compressing the implant removing the Villalba   pins. These demonstrated  correct level as well as adequate position on   rotation of the implant. Being satisfied with this, I then thoroughly irrigated  the wound. Hemostasis was finalized with bipolar electrocautery and FloSeal.   A deep drain was then placed. The platysma was then closed with 3-0 Vicryl in a  running fashion. The skin was then closed with 4-0 and 5-0 Monocryl in running  fashion. The drain was secured in place with a 2-0 nylon suture. A soft   dressing was placed. The patient was then extubated and transferred to [facility] and subsequent to the Recovery Room in stable condition.

## 2017-08-04 VITALS
DIASTOLIC BLOOD PRESSURE: 69 MMHG | SYSTOLIC BLOOD PRESSURE: 124 MMHG | HEIGHT: 60 IN | OXYGEN SATURATION: 100 % | WEIGHT: 111 LBS | TEMPERATURE: 97 F | RESPIRATION RATE: 16 BRPM | HEART RATE: 89 BPM | BODY MASS INDEX: 21.79 KG/M2

## 2017-08-04 PROCEDURE — 63600175 PHARM REV CODE 636 W HCPCS: Performed by: STUDENT IN AN ORGANIZED HEALTH CARE EDUCATION/TRAINING PROGRAM

## 2017-08-04 PROCEDURE — 97161 PT EVAL LOW COMPLEX 20 MIN: CPT

## 2017-08-04 PROCEDURE — 99900035 HC TECH TIME PER 15 MIN (STAT)

## 2017-08-04 PROCEDURE — 97530 THERAPEUTIC ACTIVITIES: CPT

## 2017-08-04 PROCEDURE — 25000003 PHARM REV CODE 250: Performed by: STUDENT IN AN ORGANIZED HEALTH CARE EDUCATION/TRAINING PROGRAM

## 2017-08-04 PROCEDURE — 97165 OT EVAL LOW COMPLEX 30 MIN: CPT

## 2017-08-04 RX ADMIN — CEFAZOLIN SODIUM 1 G: 1 SOLUTION INTRAVENOUS at 05:08

## 2017-08-04 RX ADMIN — OXYCODONE HYDROCHLORIDE AND ACETAMINOPHEN 1 TABLET: 10; 325 TABLET ORAL at 01:08

## 2017-08-04 RX ADMIN — MORPHINE SULFATE 2 MG: 2 INJECTION, SOLUTION INTRAMUSCULAR; INTRAVENOUS at 11:08

## 2017-08-04 RX ADMIN — MORPHINE SULFATE 2 MG: 2 INJECTION, SOLUTION INTRAMUSCULAR; INTRAVENOUS at 06:08

## 2017-08-04 RX ADMIN — INDOMETHACIN 25 MG: 25 CAPSULE ORAL at 10:08

## 2017-08-04 RX ADMIN — MORPHINE SULFATE 2 MG: 2 INJECTION, SOLUTION INTRAMUSCULAR; INTRAVENOUS at 01:08

## 2017-08-04 RX ADMIN — DEXAMETHASONE SODIUM PHOSPHATE 10 MG: 4 INJECTION, SOLUTION INTRAMUSCULAR; INTRAVENOUS at 05:08

## 2017-08-04 RX ADMIN — CEFAZOLIN SODIUM 1 G: 1 SOLUTION INTRAVENOUS at 11:08

## 2017-08-04 RX ADMIN — DOCUSATE SODIUM 100 MG: 50 LIQUID ORAL at 09:08

## 2017-08-04 RX ADMIN — OXYCODONE HYDROCHLORIDE AND ACETAMINOPHEN 1 TABLET: 10; 325 TABLET ORAL at 09:08

## 2017-08-04 RX ADMIN — OXYCODONE HYDROCHLORIDE AND ACETAMINOPHEN 1 TABLET: 10; 325 TABLET ORAL at 05:08

## 2017-08-04 RX ADMIN — OXYCODONE HYDROCHLORIDE AND ACETAMINOPHEN 1 TABLET: 10; 325 TABLET ORAL at 12:08

## 2017-08-04 NOTE — NURSING
All discharge instructions reviewed with patient and family at bedside. Patient with all new rx's on person, received from pharmacy. Patient aware of all follow up appointments and all questions answered. Patient to d/c, awaiting wheelchair, family at bedside to take her home. Bedside RN updated.

## 2017-08-04 NOTE — DISCHARGE SUMMARY
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 The pain has been present for about 3 years, she awoke one morning with a stiff neck and has been dealing with this ever since. The patient describes the pain as stabbing pain. The pain is worse with driving and improved by hot baths. There is no associated numbness and tingling. There is subjective weakness: specifically she reports difficulty with jars. Prior treatments have included NSAIDs, tramadol, dry needling, massage and chiropractic acare, but no injections or surgery.      The Patient denies myelopathic symptoms such as handwriting changes or difficulty with buttons/coins/keys. Denies perineal paresthesias, bowel/bladder dysfunction.       Procedure(s) (LRB):  C5/6 Disc Replacement LDR SNS: Motors/SSEP/Vocal Cord Regular OR Table C-arm (N/A)      Hospital Course:  On 8/3/2017, the patient arrived to the [facility] for proper pre-operative management.  Upon completion of pre-operative preparation, the patient was taken back to the operative theatre.  A C5-C6 disk replacement was performed without complication and the patient was transported to the post anesthesia care unit in stable condition.  After appropriate recovery from the anaesthetic agents used during the surgery, the patient was then transported to the hospital inpatient floor.  The interim of the hospital stay from arrival on the floor up to discharge has been uncomplicated. The patient has experienced  minimal electrolyte imbalances that have been repleated accordingly.  The patient's diet has progressed to a regular diet with no nausea or vomiting.  The patient's pain has been controlled using a multimodal approach. Currently, the patient's pain is well controlled on an oral regimen.  The patient has been voiding without difficulty ever since surgery. The patient began participation in physical therapy on post-operative day one and has progressed throughout the entire hospital stay.  Currently the patient is ambulating with moderate assistance and the physical therapy team feels that the patient's progress is sufficient to allow the patient to be discharged home safely.  The patient agrees with this assessment and desires a discharge to home today.              Significant Diagnostic Studies: Labs: BMP: No results for input(s): GLU, NA, K, CL, CO2, BUN, CREATININE, CALCIUM, MG in the last 48 hours. and CBC No results for input(s): WBC, HGB, HCT, PLT in the last 48 hours.  Radiology: X-Ray: cervical spine    Pending Diagnostic Studies:     Procedure Component Value Units Date/Time    X-Ray Cervical Spine AP And Lateral [[id number]] Updated:  08/04/17 1117    Order Status:  Sent Lab Status:  In process         Final Active Diagnoses:    Diagnosis Date Noted POA    PRINCIPAL PROBLEM:  Cervical radiculopathy [M54.12] 08/03/2017 Yes      Problems Resolved During this Admission:    Diagnosis Date Noted Date Resolved POA      Discharged Condition: stable    Disposition: Home or Self Care    Follow Up:  Follow-up Information     Kvng Whittaker MD. Schedule an appointment as soon as possible for a visit in 3 weeks.    Specialties:  Orthopedic Surgery, Spine Surgery  Why:  For wound re-check  Contact information:  [address]  [place] [address]  [phone]                 Patient Instructions:     Diet general     Activity as tolerated     Call MD for:  temperature >100.4     Call MD for:  persistent nausea  and vomiting or diarrhea     Call MD for:  redness, tenderness, or signs of infection (pain, swelling, redness, odor or green/yellow discharge around incision site)     Call MD for:  difficulty breathing or increased cough     Call MD for:  severe persistent headache     Call MD for:  worsening rash     Call MD for:  persistent dizziness, light-headedness, or visual disturbances     Call MD for:  increased confusion or weakness       Medications:  Reconciled Home Medications:   Discharge Medication List as of 8/4/2017  1:07 PM      START taking these medications    Details   docusate sodium (COLACE) 100 MG capsule Take 1 capsule (100 mg total) by mouth 2 (two) times daily., Starting Thu 8/3/2017, Print      indomethacin (INDOCIN) 25 MG capsule Take 1 capsule (25 mg total) by mouth 3 (three) times daily., Starting Thu 8/3/2017, Print      ondansetron (ZOFRAN) 8 MG tablet Take 1 tablet (8 mg total) by mouth every 8 (eight) hours as needed for Nausea., Starting Thu 8/3/2017, Print      oxycodone-acetaminophen (PERCOCET) 5-325 mg per tablet Take 1 tablet by mouth every 4 (four) hours as needed for Pain., Starting Thu 8/3/2017, Print         CONTINUE these medications which have NOT CHANGED    Details   ESTROGENS, CONJUGATED (PREMARIN ORAL) Take 0.125 mg by mouth nightly. , Historical Med      mupirocin calcium 2% nasl oint (BACTROBAN) 2 % Oint by Nasal route 2 (two) times daily. Apply to inside of both nostrils twice daily starting 48 hours before surgery and continuing for 5 days after surgery.    Topical ointment may be substituted if needed., Starting Mon 7/31/2017, Print      progesterone (PROMETRIUM) 200 MG capsule Take 200 mg by mouth every evening. , Historical Med         STOP taking these medications       chlorhexidine (HIBICLENS) 4 % external liquid Comments:   Reason for Stopping:         hydrocodone-acetaminophen 5-325mg (NORCO) 5-325 mg per tablet Comments:   Reason for Stopping:         multivitamin (ONE  DAILY MULTIVITAMIN) per tablet Comments:   Reason for Stopping:               Onesimo Jimenez MD  Orthopedics  [facility]

## 2017-08-04 NOTE — ASSESSMENT & PLAN NOTE
47 y.o. female POD1 s/p C5-6 disk replacement    Pain control: multimodal  PT/OT: WBAT   DVT PPx: FCDs at all times when not ambulating  Abx: postop Ancef    Dispo: f/u PT recs, likely home today

## 2017-08-04 NOTE — PLAN OF CARE
POD 1 s/p C5/6 Disc replacement. PT/OT ordered to eval and treat. PT/OT recs pending. Patient currently lives with her spouse. Patient's daughter is present at the bedside. Patient has good family support. CM completed discharge assessment and planning with patient. Patient and family verbalized understanding. All questions and concerns addressed. SW and CM will continue to follow for any additional needs. Plan A to discharge home with family support as soon as medically stable. Plan B to discharge home with family support and plans for outpatient rehab.     Patient is self-pay- not insured.    PCP: Matt Vergara MD    Pharmacy:   [facility] - [place], [place] - [address]  [address]  [place] [address]  Phone: [phone] Fax: [phone]     08/04/17 0956   Discharge Assessment   Assessment Type Discharge Planning Assessment   Confirmed/corrected address and phone number on facesheet? Yes   Assessment information obtained from? Patient;Caregiver;Medical Record   Expected Length of Stay (days) 2   Communicated expected length of stay with patient/caregiver yes   Prior to hospitilization cognitive status: Alert/Oriented   Prior to hospitalization functional status: Independent   Current cognitive status: Alert/Oriented   Current Functional Status: Independent   Arrived From home or self-care   Lives With spouse   Able to Return to Prior Arrangements yes   Is patient able to care for self after discharge? Yes   How many people do you have in your home that can help with your care after discharge? 1   Who are your caregiver(s) and their phone number(s)? spouse- Marques Bee [phone], [phone]   Patient's perception of discharge disposition home or selfcare   Readmission Within The Last 30 Days no previous admission in last 30 days   Patient currently being followed by outpatient case management? No   Patient currently receives home health services? No   Does the patient  currently use HME? No   Patient currently receives private duty nursing? No   Patient currently receives any other outside agency services? No   Equipment Currently Used at Home none   Do you have any problems affording any of your prescribed medications? No   Is the patient taking medications as prescribed? yes   Do you have any financial concerns preventing you from receiving the healthcare you need? No   Does the patient have transportation to healthcare appointments? Yes   Transportation Available family or friend will provide   On Dialysis? No   Does the patient receive services at the [facility]mad[facility] Clinic? No   Are there any open cases? No   Discharge Plan A Home with family   Discharge Plan B Home with family;Other  (outpatient rehab)   Patient/Family In Agreement With Plan yes

## 2017-08-04 NOTE — PROGRESS NOTES
[facility]  Orthopedics  Progress Note    Patient Name: Sammie Bee  MRN: [id number]  Admission Date: 8/3/2017  Hospital Length of Stay: 1 days  Attending Provider: Kvng Whittaker MD  Primary Care Provider: Matt Vergara MD  Follow-up For: Procedure(s) (LRB):  C5/6 Disc Replacement LDR SNS: Motors/SSEP/Vocal Cord Regular OR Table C-arm (N/A)    Post-Operative Day: 1 Day Post-Op  Subjective:     Principal Problem:Cervical radiculopathy    Principal Orthopedic Problem: same    Interval History: Patient seen and examined at bedside.  No acute events overnight.  Pain controlled.  Able to ambulate yesterday.      Review of patient's allergies indicates:   Allergen Reactions    Latex, natural rubber        Current Facility-Administered Medications   Medication    ceFAZolin (ANCEF) 1 gram in dextrose 5 % 50 mL IVPB (premix)    dexamethasone injection 10 mg    diphenhydrAMINE capsule 25 mg    docusate 50 mg/5 mL liquid 100 mg    indomethacin capsule 25 mg    lorazepam injection 0.25 mg    morphine injection 2 mg    ondansetron tablet 4 mg    oxycodone-acetaminophen  mg per tablet 1 tablet    oxycodone-acetaminophen 5-325 mg per tablet 1 tablet    promethazine tablet 12.5 mg    ramelteon tablet 8 mg     Objective:     Vital Signs (Most Recent):  Temp: 98.7 °F (37.1 °C) (08/04/17 0300)  Pulse: 91 (08/04/17 0300)  Resp: 16 (08/04/17 0300)  BP: (!) 99/57 (08/04/17 0300)  SpO2: 97 % (08/04/17 0300) Vital Signs (24h Range):  Temp:  [96.9 °F (36.1 °C)-98.7 °F (37.1 °C)] 98.7 °F (37.1 °C)  Pulse:  [81-97] 91  Resp:  [12-20] 16  SpO2:  [94 %-100 %] 97 %  BP: ()/(57-89) 99/57     Weight: 50.3 kg (111 lb)  Height: 5' (152.4 cm)  Body mass index is 21.68 kg/m².      Intake/Output Summary (Last 24 hours) at 08/04/17 0612  Last data filed at 08/04/17 0600   Gross per 24 hour   Intake             1530 ml   Output               40 ml   Net             1490 ml       Ortho/SPM Exam    AAOx4  NAD  RRR  No increased WOB  Dressing c/d/i  SILT and motor intact C5-T1        Significant Labs: All pertinent labs within the past 24 hours have been reviewed.    Significant Imaging: None    Assessment/Plan:     * Cervical radiculopathy    47 y.o. female POD1 s/p C5-6 disk replacement    Pain control: multimodal  PT/OT: WBAT   DVT PPx: FCDs at all times when not ambulating  Abx: postop Ancef    Dispo: f/u PT recs, likely home today                  Onesimo Jimenez MD  Orthopedics  [facility]

## 2017-08-04 NOTE — HOSPITAL COURSE
On 8/3/2017, the patient arrived to the [facility] for proper pre-operative management.  Upon completion of pre-operative preparation, the patient was taken back to the operative theatre.  A C5-C6 disk replacement was performed without complication and the patient was transported to the post anesthesia care unit in stable condition.  After appropriate recovery from the anaesthetic agents used during the surgery, the patient was then transported to the hospital inpatient floor.  The interim of the hospital stay from arrival on the floor up to discharge has been uncomplicated. The patient has experienced minimal electrolyte imbalances that have been repleated accordingly.  The patient's diet has progressed to a regular diet with no nausea or vomiting.  The patient's pain has been controlled using a multimodal approach. Currently, the patient's pain is well controlled on an oral regimen.  The patient has been voiding without difficulty ever since surgery. The patient began participation in physical therapy on post-operative day one and has progressed throughout the entire hospital stay.  Currently the patient is ambulating with moderate assistance and the physical therapy team feels that the patient's progress is sufficient to allow the patient to be discharged home safely.  The patient agrees with this assessment and desires a discharge to home today.

## 2017-08-04 NOTE — PLAN OF CARE
Problem: Physical Therapy Goal  Goal: Physical Therapy Goal  Goals to be met by: 8/4/2017    Patient safe to return home with family.    Outcome: Outcome(s) achieved Date Met: 08/04/17  Evaluation complete. No skilled acute services needed. Se.     Enrique Swan III, DPT  8/4/2017

## 2017-08-04 NOTE — PLAN OF CARE
Problem: Patient Care Overview  Goal: Plan of Care Review  Outcome: Ongoing (interventions implemented as appropriate)  No acute events occurred overnight.  Pt complained of severe pain to the incisional area and throat.  No problems swallowing or breathing.  PRN medication given ATC.  Pt states pain is better controlled this AM.  NS at 90 mL/h.  IV antibiotics hung and completed.  Pt is independent with standby assist.  No falls or injuries occurred overnight.  Family remains at bedside.  Will continue to monitor.

## 2017-08-04 NOTE — PLAN OF CARE
Problem: Occupational Therapy Goal  Goal: Occupational Therapy Goal  Outcome: Outcome(s) achieved Date Met: 08/04/17  Pt is (I). OT to d/c.    BISI Faust  8/4/2017  Pager: [phone]

## 2017-08-04 NOTE — SUBJECTIVE & OBJECTIVE
Principal Problem:Cervical radiculopathy    Principal Orthopedic Problem: same    Interval History: Patient seen and examined at bedside.  No acute events overnight.  Pain controlled.  Able to ambulate yesterday.      Review of patient's allergies indicates:   Allergen Reactions    Latex, natural rubber        Current Facility-Administered Medications   Medication    ceFAZolin (ANCEF) 1 gram in dextrose 5 % 50 mL IVPB (premix)    dexamethasone injection 10 mg    diphenhydrAMINE capsule 25 mg    docusate 50 mg/5 mL liquid 100 mg    indomethacin capsule 25 mg    lorazepam injection 0.25 mg    morphine injection 2 mg    ondansetron tablet 4 mg    oxycodone-acetaminophen  mg per tablet 1 tablet    oxycodone-acetaminophen 5-325 mg per tablet 1 tablet    promethazine tablet 12.5 mg    ramelteon tablet 8 mg     Objective:     Vital Signs (Most Recent):  Temp: 98.7 °F (37.1 °C) (08/04/17 0300)  Pulse: 91 (08/04/17 0300)  Resp: 16 (08/04/17 0300)  BP: (!) 99/57 (08/04/17 0300)  SpO2: 97 % (08/04/17 0300) Vital Signs (24h Range):  Temp:  [96.9 °F (36.1 °C)-98.7 °F (37.1 °C)] 98.7 °F (37.1 °C)  Pulse:  [81-97] 91  Resp:  [12-20] 16  SpO2:  [94 %-100 %] 97 %  BP: ()/(57-89) 99/57     Weight: 50.3 kg (111 lb)  Height: 5' (152.4 cm)  Body mass index is 21.68 kg/m².      Intake/Output Summary (Last 24 hours) at 08/04/17 0612  Last data filed at 08/04/17 0600   Gross per 24 hour   Intake             1530 ml   Output               40 ml   Net             1490 ml       Ortho/SPM Exam   AAOx4  NAD  RRR  No increased WOB  Dressing c/d/i  SILT and motor intact C5-T1        Significant Labs: All pertinent labs within the past 24 hours have been reviewed.    Significant Imaging: None

## 2017-08-04 NOTE — HPI
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 The pain has been present for about 3 years, she awoke one morning with a stiff neck and has been dealing with this ever since. The patient describes the pain as stabbing pain. The pain is worse with driving and improved by hot baths. There is no associated numbness and tingling. There is subjective weakness: specifically she reports difficulty with jars. Prior treatments have included NSAIDs, tramadol, dry needling, massage and chiropractic acare, but no injections or surgery.      The Patient denies myelopathic symptoms such as handwriting changes or difficulty with buttons/coins/keys. Denies perineal paresthesias, bowel/bladder dysfunction.

## 2017-08-04 NOTE — PT/OT/SLP EVAL
Physical Therapy  Evaluation    Sammie Bee   MRN: [id number]   Admitting Diagnosis: Cervical radiculopathy    PT Received On: 08/04/17  PT Start Time: 0855     PT Stop Time: 0915    PT Total Time (min): 20 min       Billable Minutes:  Evaluation 12 and Therapeutic Activity 8    Diagnosis: Cervical radiculopathy  S/p C5-6 disc replacement    History reviewed. No pertinent past medical history.   History reviewed. No pertinent surgical history.    Referring physician: Panfilo  Date referred to PT: 8/4/2017    General Precautions: Standard,  (none)  Orthopedic Precautions:  (WBAT; lifting precautions; no ROM precautions)   Braces: N/A       Do you have any cultural, spiritual, Advent conflicts, given your current situation?: none stated    Patient History:  Lives With: child(scott), adult, spouse  Living Arrangements: house  Home Accessibility: stairs within home  Number of Stairs Within Home: 16  Stair Railings at Home: inside, present at both sides  Transportation Available: car, family or friend will provide  Living Environment Comment: Patient lives with spouse and 16-year old son in 2-story home No SHIRLEY. Bed/bath upstairs (16 BHR). Walk-in shower/regular toilet. No DME. I with ADLs and mobility prior to admit. Family to assist upon discharge.  Equipment Currently Used at Home: none  DME owned (not currently used): none    Previous Level of Function:  Ambulation Skills: independent  Transfer Skills: independent  ADL Skills: independent  Work/Leisure Activity: independent (driving and working at [employer])    Subjective:  Communicated with RN prior to session.  Patient agreeable to PT session. Daughter present.  Chief Complaint: anterior neck pain  Patient goals: return to independence    Pain/Comfort  Pain Rating 1:  (reports anterior neck pain but does not rate with VAS scale)  Pain Addressed 1: Reposition, Distraction, Cessation of Activity  Pain Rating Post-Intervention 1:  (does not report increase pain  with functional mobility)      Objective:   Patient found with:  (none; MD resident present to remove drain prior to session)     Cognitive Exam:  Oriented to: Person, Place, Time and Situation    Follows Commands/attention: Follows multistep  commands  Communication: clear/fluent  Safety awareness/insight to disability: intact    Physical Exam:  Postural examination/scapula alignment: No postural abnormalities identified    Skin integrity: Visible skin intact  Edema: None noted     Sensation:   Intact to light touch BLE. No numbness/tingling reported in BUE/BLE.    Lower Extremity Range of Motion:  Right Lower Extremity: WFL  Left Lower Extremity: WFL    Lower Extremity Strength:  Right Lower Extremity: WFL  Left Lower Extremity: WFL    Gross motor coordination: WFL    Functional Mobility:  Bed Mobility:  Rolling/Turning to Left: Modified independent  Rolling/Turning Right: Modified independent  Scooting/Bridging: Modified Independent  Supine to Sit: Modified Independent  Sit to Supine: Modified Independent    Transfers:  Sit <> Stand Assistance: Modified Independent  Sit <> Stand Assistive Device: No Assistive Device  Bed <> Chair Technique: Stand Pivot  Bed <> Chair Assistance: Modified Independent  Bed <> Chair Assistive Device: No Assistive Device    Gait:   Gait Distance: 100ft  Assistance 1: Stand by Assistance  Gait Assistive Device: No device  Gait Pattern: reciprocal    Stairs:  Pt ascended/descend 12 stair(s) with No Assistive Device with right with Supervision or Set-up Assistance.     Balance:   Static Sit: NORMAL: No deviations seen in posture held statically  Dynamic Sit: NORMAL: No deviations seen in posture held dynamically  Static Stand: NORMAL: No deviations seen in posture held statically  Dynamic stand: NORMAL: No deviations seen in posture held dynamically    Therapeutic Activities and Exercises:  Patient and daughter educated on role of PT/POC.    Mod I with bed mobility and sit<>stand/stand  pivot transfers without assistive device    Gait 100ft SBA without assistive device or LOB    Stairs: 12 SBA with R handrail reciprocal ascent/descent    Additional education provided on:  No ROM restrictions  Lifting precautions: no heavier than a gallon of milk  PT/POC including OPPT follow-up    White board updated: safe to ambulate with RN staff    AM-PAC 6 CLICK MOBILITY  How much help from another person does this patient currently need?   1 = Unable, Total/Dependent Assistance  2 = A lot, Maximum/Moderate Assistance  3 = A little, Minimum/Contact Guard/Supervision  4 = None, Modified [place]/Independent    Turning over in bed (including adjusting bedclothes, sheets and blankets)?: 4  Sitting down on and standing up from a chair with arms (e.g., wheelchair, bedside commode, etc.): 4  Moving from lying on back to sitting on the side of the bed?: 4  Moving to and from a bed to a chair (including a wheelchair)?: 4  Need to walk in hospital room?: 4  Climbing 3-5 steps with a railing?: 4  Total Score: 24     AM-PAC Raw Score CMS G-Code Modifier Level of Impairment Assistance   6 % Total / Unable   7 - 9 CM 80 - 100% Maximal Assist   10 - 14 CL 60 - 80% Moderate Assist   15 - 19 CK 40 - 60% Moderate Assist   20 - 22 CJ 20 - 40% Minimal Assist   23 CI 1-20% SBA / CGA   24 CH 0% Independent/ Mod I     Patient left up in chair with all lines intact, call button in reach and RN notified.    Assessment:   Sammie Bee is a 47 y.o. female with a medical diagnosis of Cervical radiculopathy and s/p C5-6 disc replacement.    History: personal factors and/or comorbidities that impact the plan of care: s/p C5-6 disc replacement  Evaluation of Body Systems: cardiovascular/pulmonary, integumentary, musculoskeletal, neuromuscular, cognition/communication  Functional Outcome Tools: AMPAC, ROM, MMT  Clinical Presentation: stable      Evaluation Complexity Level: Low      Rehab identified problem  list/impairments: Rehab identified problem list/impairments: pain, orthopedic precautions    Rehab potential is good.    Activity tolerance: Good    Discharge recommendations: Discharge Facility/Level Of Care Needs: home (progress to OPPT after MD follow-up)     Barriers to discharge: Barriers to Discharge: None    Equipment recommendations: Equipment Needed After Discharge: none     GOALS:    Physical Therapy Goals     Not on file          Multidisciplinary Problems (Resolved)        Problem: Physical Therapy Goal    Goal Priority Disciplines Outcome Goal Variances Interventions   Physical Therapy Goal   (Resolved)     PT/OT, PT Outcome(s) achieved     Description:  Goals to be met by: 8/4/2017    Patient safe to return home with family.                      PLAN:    Eval&DC  No skilled acute PT services needed at this time.  Plan of Care reviewed with: patient, daughter    Enrique WANG Beny CARSON, PT  08/04/2017

## 2017-08-04 NOTE — PT/OT/SLP EVAL
"Occupational Therapy  Evaluation/ Discharge Summary    Sammie Bee   MRN: [id number]   Admitting Diagnosis: Cervical radiculopathy    OT Date of Treatment: 08/04/17   OT Start Time: 0855  OT Stop Time: 0912  OT Total Time (min): 17 min    Billable Minutes:  Evaluation 17 minutes    Diagnosis: Cervical radiculopathy   Pain    History reviewed. No pertinent past medical history.   History reviewed. No pertinent surgical history.    Referring physician: KASH Whittaker  Date referred to OT: 8/4/2017    General Precautions: Standard,  (n/a)  Orthopedic Precautions:  (WBAT; lifting restrictions per discharge instructions from MD)    Do you have any cultural, spiritual, Zoroastrian conflicts, given your current situation?: none stated     Patient History:  Living Environment  Lives With: child(scott), adult, spouse  Living Arrangements: house  Home Accessibility: stairs within home  Number of Stairs Within Home: 16  Stair Railings at Home: inside, present at both sides  Transportation Available: car, family or friend will provide  Living Environment Comment: Pt lives with spouse and 16 year old son (daughter lives nearby and college-age son is home for the summer), in a H with no SHIRLEY, with bedroom and bathroom upstairs via 16 stairs with B rails. Pt has both a tub and walk-in shower, and no DME. Pt was (I) PTA.  Equipment Currently Used at Home: none    Prior level of function:   Bed Mobility/Transfers: independent  Grooming: independent  Bathing: independent  Upper Body Dressing: independent  Lower Body Dressing: independent  Toileting: independent  Home Management Skills: independent  Homemaking Responsibilities: Yes  Mode of Transportation: Car, Family     Dominant hand: right    Subjective:  Communicated with RN prior to session.  "My neck hurts, but I'm ready to go home."  Chief Complaint: pain  Patient/Family stated goals: go home    Pain/Comfort  Pain Rating 1:  (Did not rate, but was able to tolerate pain and " reports she will ask the RN for her next dose of pain medication)    Objective:   Pt found supine with HOB elevated and agreeable to coeval with OT/PT    Cognitive Exam:  Oriented to: Person, Place, Time and Situation  Follows Commands/attention: Follows multistep  commands  Communication: clear/fluent  Memory:  No Deficits noted  Safety awareness/insight to disability: intact  Coping skills/emotional control: Appropriate to situation    Visual/perceptual:  Intact    Physical Exam:  Postural examination/scapula alignment: No postural abnormalities identified  Skin integrity: Visible skin intact  Edema: None noted     Sensation:   Intact    Upper Extremity Range of Motion:  Right Upper Extremity: WFL  Left Upper Extremity: WFL    Upper Extremity Strength:  Right Upper Extremity: WFL  Left Upper Extremity: WFL   Strength: Good    Fine motor coordination:   Intact    Gross motor coordination: WFL    Functional Mobility:  Bed Mobility:  Rolling/Turning to Left: Independent  Rolling/Turning Right: Independent  Scooting/Bridging: Independent  Supine to Sit: Independent    Transfers:  Sit <> Stand Assistance: Independent  Sit <> Stand Assistive Device: No Assistive Device  Toilet Transfer Technique: Stand Pivot  Toilet Transfer Assistance: Independent  Toilet Transfer Assistive Device: No Assistive Device    Functional Ambulation: (I), Supervision for stairs.    Activities of Daily Living:    UE Dressing Level of Assistance: Independent (dress)  LE Dressing Level of Assistance: Independent (underwear, shoes)  Toileting Where Assessed: Toilet  Toileting Level of Assistance: Independent    Balance:   Static Sit: NORMAL: No deviations seen in posture held statically  Dynamic Sit: NORMAL: No deviations seen in posture held dynamically  Static Stand: GOOD+: Takes MAXIMAL challenges from all directions  Dynamic stand: GOOD+: Independent gait (with or without assistive device) - Supervision for climbing stairs    Therapeutic  "Activities and Exercises:  Pt ed re OT role and POC. Pt performed bed mobility, transfers, ambulation, stairs, dressing, toileting and grooming tasks with (I) physically and overall Supervision for safety 2/2 pain medication and some dizziness (no LOB)    AM-PAC 6 CLICK ADL  How much help from another person does this patient currently need?  1 = Unable, Total/Dependent Assistance  2 = A lot, Maximum/Moderate Assistance  3 = A little, Minimum/Contact Guard/Supervision  4 = None, Modified [place]/Independent    Putting on and taking off regular lower body clothing? : 4  Bathing (including washing, rinsing, drying)?: 4  Toileting, which includes using toilet, bedpan, or urinal? : 4  Putting on and taking off regular upper body clothing?: 4  Taking care of personal grooming such as brushing teeth?: 4  Eating meals?: 4  Total Score: 24    AM-PAC Raw Score CMS "G-Code Modifier Level of Impairment Assistance   6 % Total / Unable   7 - 9 CM 80 - 100% Maximal Assist   10-14 CL 60 - 80% Moderate Assist   15 - 19 CK 40 - 60% Moderate Assist   20 - 22 CJ 20 - 40% Minimal Assist   23 CI 1-20% SBA / CGA   24 CH 0% Independent/ Mod I       Patient left sitting EOB with daughter present    Assessment:  Sammie Bee is a 47 y.o. female with a medical diagnosis of Cervical radiculopathy and presents with no limitations in self care or mobility. Pt will require A with household management 2/2 lifting restrictions, but pt is able to perform all basic self care tasks and is safe with mobility. Pt to return home with no needs. OT to d/c.    Rehab identified problem list/impairments: Rehab identified problem list/impairments: pain    Rehab potential is excellent.    Activity tolerance: Good    Discharge recommendations: Discharge Facility/Level Of Care Needs: home (progress to OPPT as needed if MD agrees appropriate post-follow-up)     Barriers to discharge: Barriers to Discharge: None    Equipment recommendations: " none     GOALS:    Occupational Therapy Goals     Not on file          Multidisciplinary Problems (Resolved)        Problem: Occupational Therapy Goal    Goal Priority Disciplines Outcome Interventions   Occupational Therapy Goal   (Resolved)     OT, PT/OT Outcome(s) achieved                    PLAN:  OT to d/c.    BISI Faust  8/4/2017  Pager: [phone]

## 2017-08-07 NOTE — ANESTHESIA POSTPROCEDURE EVALUATION
Anesthesia Post Evaluation    Patient: Sammie Bee    Procedure(s) Performed: Procedure(s) (LRB):  C5/6 Disc Replacement LDR SNS: Motors/SSEP/Vocal Cord Regular OR Table C-arm (N/A)    Final Anesthesia Type: general  Patient location during evaluation: PACU  Patient participation: Yes- Able to Participate  Level of consciousness: awake and alert and oriented  Post-procedure vital signs: reviewed and stable  Pain management: adequate  Airway patency: patent  PONV status at discharge: No PONV  Anesthetic complications: no      Cardiovascular status: hemodynamically stable  Respiratory status: unassisted, spontaneous ventilation and room air  Hydration status: euvolemic  Follow-up not needed.        Visit Vitals  /69 (BP Location: Right arm, Patient Position: Lying, BP Method: Automatic)   Pulse 89   Temp 36.2 °C (97.2 °F)   Resp 16   Ht 5' (1.524 m)   Wt 50.3 kg (111 lb)   SpO2 100%   Breastfeeding? No   BMI 21.68 kg/m²       Pain/Vinnie Score: No Data Recorded

## 2017-08-10 ENCOUNTER — PATIENT MESSAGE (OUTPATIENT)
Dept: ORTHOPEDICS | Facility: CLINIC | Age: 47
End: 2017-08-10

## 2017-08-10 ENCOUNTER — PATIENT MESSAGE (OUTPATIENT)
Dept: SPINE | Facility: CLINIC | Age: 47
End: 2017-08-10

## 2017-08-10 DIAGNOSIS — Z98.890 S/P SPINAL SURGERY: Primary | ICD-10-CM

## 2017-08-10 RX ORDER — HYDROCODONE BITARTRATE AND ACETAMINOPHEN 5; 325 MG/1; MG/1
1 TABLET ORAL EVERY 4 HOURS PRN
Qty: 90 TABLET | Refills: 0 | Status: SHIPPED | OUTPATIENT
Start: 2017-08-10 | End: 2017-08-20

## 2017-08-10 RX ORDER — HYDROCODONE BITARTRATE AND ACETAMINOPHEN 5; 325 MG/1; MG/1
1 TABLET ORAL EVERY 4 HOURS PRN
Qty: 90 TABLET | Refills: 0 | OUTPATIENT
Start: 2017-08-10 | End: 2017-08-20

## 2017-08-16 ENCOUNTER — OFFICE VISIT (OUTPATIENT)
Dept: ORTHOPEDICS | Facility: CLINIC | Age: 47
End: 2017-08-16

## 2017-08-16 ENCOUNTER — TELEPHONE (OUTPATIENT)
Dept: ORTHOPEDICS | Facility: CLINIC | Age: 47
End: 2017-08-16

## 2017-08-16 ENCOUNTER — HOSPITAL ENCOUNTER (OUTPATIENT)
Dept: RADIOLOGY | Facility: HOSPITAL | Age: 47
Discharge: HOME | End: 2017-08-16
Attending: ORTHOPAEDIC SURGERY

## 2017-08-16 VITALS — WEIGHT: 114.31 LBS | HEIGHT: 60 IN | BODY MASS INDEX: 22.44 KG/M2

## 2017-08-16 DIAGNOSIS — Z98.1 S/P SPINAL FUSION: Primary | ICD-10-CM

## 2017-08-16 DIAGNOSIS — Z98.890 S/P CERVICAL DISC REPLACEMENT: Primary | ICD-10-CM

## 2017-08-16 DIAGNOSIS — Z98.1 S/P SPINAL FUSION: ICD-10-CM

## 2017-08-16 PROCEDURE — 99999 PR PBB SHADOW E&M-EST. PATIENT-LVL III: CPT | Mod: PBBFAC,,, | Performed by: ORTHOPAEDIC SURGERY

## 2017-08-16 PROCEDURE — 99213 OFFICE O/P EST LOW 20 MIN: CPT | Mod: PBBFAC,25 | Performed by: ORTHOPAEDIC SURGERY

## 2017-08-16 PROCEDURE — 72040 X-RAY EXAM NECK SPINE 2-3 VW: CPT | Mod: 26,,, | Performed by: RADIOLOGY

## 2017-08-16 PROCEDURE — 99024 POSTOP FOLLOW-UP VISIT: CPT | Mod: ,,, | Performed by: ORTHOPAEDIC SURGERY

## 2017-08-16 PROCEDURE — 72040 X-RAY EXAM NECK SPINE 2-3 VW: CPT | Mod: TC

## 2017-08-16 RX ORDER — METHYLPREDNISOLONE 4 MG/1
TABLET ORAL
Qty: 1 PACKAGE | Refills: 0 | Status: SHIPPED | OUTPATIENT
Start: 2017-08-16 | End: 2017-12-08

## 2017-08-16 RX ORDER — MELOXICAM 7.5 MG/1
7.5 TABLET ORAL DAILY
Qty: 21 TABLET | Refills: 0 | Status: SHIPPED | OUTPATIENT
Start: 2017-08-16 | End: 2017-09-06

## 2017-08-16 RX ORDER — METHOCARBAMOL 750 MG/1
750 TABLET, FILM COATED ORAL 3 TIMES DAILY
Qty: 60 TABLET | Refills: 0 | Status: SHIPPED | OUTPATIENT
Start: 2017-08-16 | End: 2017-08-24 | Stop reason: SDUPTHER

## 2017-08-16 NOTE — PROGRESS NOTES
Date of Surgery: 8/3/17    Procedure: C5/6 disc replacement    History: Sammie Bee is seen today for follow-up following the above listed procedure. Overall the patient had been doing well with almost complete resolution of her R arm pain but had a sneeze this Saturday and has developed L sided neck pain and radicular symptoms.    Exam: Incision is healing well. There is no sign of infection. Neuro exam is stable. No signs of DVT.    Radiographs: stable implants s/p C5/6 TDA    Assessment/Plan:   Small postop setback  1) Medrol dosepak and muscle relaxers..

## 2017-08-24 ENCOUNTER — OFFICE VISIT (OUTPATIENT)
Dept: ORTHOPEDICS | Facility: CLINIC | Age: 47
End: 2017-08-24

## 2017-08-24 VITALS — BODY MASS INDEX: 22.05 KG/M2 | WEIGHT: 112.31 LBS | HEIGHT: 60 IN

## 2017-08-24 DIAGNOSIS — Z98.890 S/P CERVICAL DISC REPLACEMENT: Primary | ICD-10-CM

## 2017-08-24 PROCEDURE — 99213 OFFICE O/P EST LOW 20 MIN: CPT | Mod: PBBFAC | Performed by: PHYSICIAN ASSISTANT

## 2017-08-24 PROCEDURE — 99999 PR PBB SHADOW E&M-EST. PATIENT-LVL III: CPT | Mod: PBBFAC,,, | Performed by: PHYSICIAN ASSISTANT

## 2017-08-24 PROCEDURE — 99024 POSTOP FOLLOW-UP VISIT: CPT | Mod: ,,, | Performed by: PHYSICIAN ASSISTANT

## 2017-08-24 RX ORDER — METHOCARBAMOL 750 MG/1
750 TABLET, FILM COATED ORAL 3 TIMES DAILY
Qty: 90 TABLET | Refills: 0 | Status: SHIPPED | OUTPATIENT
Start: 2017-08-24 | End: 2017-09-13

## 2017-08-24 RX ORDER — HYDROCODONE BITARTRATE AND ACETAMINOPHEN 5; 325 MG/1; MG/1
1 TABLET ORAL
Qty: 90 TABLET | Refills: 0 | Status: SHIPPED | OUTPATIENT
Start: 2017-08-24 | End: 2017-09-03

## 2017-08-24 NOTE — PROGRESS NOTES
Date: 08/24/2017    Supervising Physician: Kvng Whittaker M.D.    Date of Surgery: 8/3/2017    Procedure: C5/6 disc replacement    History: Sammie Bee is seen today for follow-up following the above listed procedure. Overall the patient is doing well but today notes continued trapezial muscle pain.  The pain in her right arm is improved.  She is very anxious about her pain and what to expect.   Pain is well controlled with current pain medication.  She is taking hydrocodone and robaxin 3-4 times per day.  She is hesitant to take it as prescribed.   she denies fever, chills, and sweats since the time of the surgery.       Exam: Incision is healing well, clean, dry and intact.   There is no sign of infection. Neuro exam is stable. No signs of DVT.    Radiographs: no new imaging today    Assessment/Plan: 3 weeks post op.    Doing well postoperatively. Refill pain medications given today.  Discussed with the patient that it is ok for her to take the pain medication regularly as prescribed.      I will plan to see the patient back for the next postop visit in 3 weeks with imaging.     Thank you for the opportunity to participate in this patient's care. Please give me a call if there are any concerns or questions.

## 2017-09-15 ENCOUNTER — PATIENT MESSAGE (OUTPATIENT)
Dept: ORTHOPEDICS | Facility: CLINIC | Age: 47
End: 2017-09-15

## 2017-09-15 ENCOUNTER — OFFICE VISIT (OUTPATIENT)
Dept: ORTHOPEDICS | Facility: CLINIC | Age: 47
End: 2017-09-15

## 2017-09-15 ENCOUNTER — HOSPITAL ENCOUNTER (OUTPATIENT)
Dept: RADIOLOGY | Facility: HOSPITAL | Age: 47
Discharge: HOME | End: 2017-09-15
Attending: PHYSICIAN ASSISTANT

## 2017-09-15 VITALS — WEIGHT: 112.44 LBS | BODY MASS INDEX: 22.07 KG/M2 | HEIGHT: 60 IN

## 2017-09-15 DIAGNOSIS — Z98.890 S/P CERVICAL DISC REPLACEMENT: Primary | ICD-10-CM

## 2017-09-15 DIAGNOSIS — Z98.890 S/P CERVICAL DISC REPLACEMENT: ICD-10-CM

## 2017-09-15 PROCEDURE — 99999 PR PBB SHADOW E&M-EST. PATIENT-LVL III: CPT | Mod: PBBFAC,,, | Performed by: PHYSICIAN ASSISTANT

## 2017-09-15 PROCEDURE — 72040 X-RAY EXAM NECK SPINE 2-3 VW: CPT | Mod: 26,,, | Performed by: RADIOLOGY

## 2017-09-15 PROCEDURE — 99213 OFFICE O/P EST LOW 20 MIN: CPT | Mod: PBBFAC,25 | Performed by: PHYSICIAN ASSISTANT

## 2017-09-15 PROCEDURE — 99024 POSTOP FOLLOW-UP VISIT: CPT | Mod: ,,, | Performed by: PHYSICIAN ASSISTANT

## 2017-09-15 PROCEDURE — 72040 X-RAY EXAM NECK SPINE 2-3 VW: CPT | Mod: TC

## 2017-09-15 RX ORDER — HYDROCODONE BITARTRATE AND ACETAMINOPHEN 10; 325 MG/1; MG/1
1 TABLET ORAL
Qty: 90 TABLET | Refills: 0 | Status: SHIPPED | OUTPATIENT
Start: 2017-09-15 | End: 2017-09-15 | Stop reason: SDUPTHER

## 2017-09-15 RX ORDER — HYDROCODONE BITARTRATE AND ACETAMINOPHEN 10; 325 MG/1; MG/1
1 TABLET ORAL
Qty: 90 TABLET | Refills: 0 | Status: SHIPPED | OUTPATIENT
Start: 2017-09-15 | End: 2017-10-03 | Stop reason: SDUPTHER

## 2017-09-15 RX ORDER — METHOCARBAMOL 750 MG/1
750 TABLET, FILM COATED ORAL 4 TIMES DAILY
Qty: 40 TABLET | Refills: 0 | Status: SHIPPED | OUTPATIENT
Start: 2017-09-15 | End: 2017-09-15 | Stop reason: SDUPTHER

## 2017-09-15 RX ORDER — METHOCARBAMOL 750 MG/1
750 TABLET, FILM COATED ORAL 4 TIMES DAILY
Qty: 40 TABLET | Refills: 0 | Status: SHIPPED | OUTPATIENT
Start: 2017-09-15 | End: 2017-09-25

## 2017-09-15 NOTE — PROGRESS NOTES
Date: 09/15/2017    Supervising Physician: Kvng Whittaker M.D.    Date of Surgery: 8/3/2017    Procedure: C5/6 disc replacement    History: Sammie Bee is seen today for follow-up following the above listed procedure. Overall the patient is doing well but today notes continued trapezial muscle pain.   She is taking hydrocodone and robaxin.  she denies fever, chills, and sweats since the time of the surgery.       Exam:  Incision is healed.   There is no sign of infection. Neuro exam is stable. No signs of DVT.    Radiographs: imaging shows arthroplasty in place, good positioning and alignment.  No evidence of failure.     Assessment/Plan: 6 weeks post op.    Doing well postoperatively. Refill norco given today.  Referral for PT at [facility] today. Follow up in 4 weeks    I will plan to see the patient back for the next postop visit in 4 weeks with imaging.     Thank you for the opportunity to participate in this patient's care. Please give me a call if there are any concerns or questions.

## 2017-09-20 ENCOUNTER — PATIENT MESSAGE (OUTPATIENT)
Dept: ORTHOPEDICS | Facility: CLINIC | Age: 47
End: 2017-09-20

## 2017-09-20 DIAGNOSIS — M25.519 SHOULDER PAIN, UNSPECIFIED CHRONICITY, UNSPECIFIED LATERALITY: Primary | ICD-10-CM

## 2017-09-22 ENCOUNTER — HOSPITAL ENCOUNTER (OUTPATIENT)
Dept: RADIOLOGY | Facility: HOSPITAL | Age: 47
Discharge: HOME | End: 2017-09-22
Attending: PHYSICIAN ASSISTANT

## 2017-09-22 DIAGNOSIS — M25.519 SHOULDER PAIN, UNSPECIFIED CHRONICITY, UNSPECIFIED LATERALITY: ICD-10-CM

## 2017-09-22 PROCEDURE — 73030 X-RAY EXAM OF SHOULDER: CPT | Mod: TC,PO,RT

## 2017-09-22 PROCEDURE — 73030 X-RAY EXAM OF SHOULDER: CPT | Mod: 26,RT,, | Performed by: RADIOLOGY

## 2017-10-02 ENCOUNTER — PATIENT MESSAGE (OUTPATIENT)
Dept: ORTHOPEDICS | Facility: CLINIC | Age: 47
End: 2017-10-02

## 2017-10-03 RX ORDER — METHOCARBAMOL 750 MG/1
750 TABLET, FILM COATED ORAL 4 TIMES DAILY
Qty: 60 TABLET | Refills: 0 | Status: SHIPPED | OUTPATIENT
Start: 2017-10-03 | End: 2017-10-13 | Stop reason: SDUPTHER

## 2017-10-03 RX ORDER — HYDROCODONE BITARTRATE AND ACETAMINOPHEN 10; 325 MG/1; MG/1
1 TABLET ORAL EVERY 6 HOURS PRN
Qty: 90 TABLET | Refills: 0 | Status: SHIPPED | OUTPATIENT
Start: 2017-10-03 | End: 2017-11-10 | Stop reason: SDUPTHER

## 2017-10-12 ENCOUNTER — TELEPHONE (OUTPATIENT)
Dept: ORTHOPEDICS | Facility: CLINIC | Age: 47
End: 2017-10-12

## 2017-10-13 ENCOUNTER — OFFICE VISIT (OUTPATIENT)
Dept: ORTHOPEDICS | Facility: CLINIC | Age: 47
End: 2017-10-13

## 2017-10-13 ENCOUNTER — HOSPITAL ENCOUNTER (OUTPATIENT)
Dept: RADIOLOGY | Facility: HOSPITAL | Age: 47
Discharge: HOME | End: 2017-10-13
Attending: PHYSICIAN ASSISTANT

## 2017-10-13 VITALS — BODY MASS INDEX: 22.07 KG/M2 | HEIGHT: 60 IN | WEIGHT: 112.44 LBS

## 2017-10-13 DIAGNOSIS — Z98.890 S/P CERVICAL DISC REPLACEMENT: ICD-10-CM

## 2017-10-13 DIAGNOSIS — Z98.890 S/P CERVICAL DISC REPLACEMENT: Primary | ICD-10-CM

## 2017-10-13 PROCEDURE — 72040 X-RAY EXAM NECK SPINE 2-3 VW: CPT | Mod: 26,,, | Performed by: RADIOLOGY

## 2017-10-13 PROCEDURE — 99213 OFFICE O/P EST LOW 20 MIN: CPT | Mod: PBBFAC,25 | Performed by: PHYSICIAN ASSISTANT

## 2017-10-13 PROCEDURE — 99999 PR PBB SHADOW E&M-EST. PATIENT-LVL III: CPT | Mod: PBBFAC,,, | Performed by: PHYSICIAN ASSISTANT

## 2017-10-13 PROCEDURE — 72040 X-RAY EXAM NECK SPINE 2-3 VW: CPT | Mod: TC

## 2017-10-13 PROCEDURE — 99024 POSTOP FOLLOW-UP VISIT: CPT | Mod: ,,, | Performed by: PHYSICIAN ASSISTANT

## 2017-10-13 RX ORDER — METHOCARBAMOL 750 MG/1
750 TABLET, FILM COATED ORAL 4 TIMES DAILY
Qty: 60 TABLET | Refills: 0 | Status: SHIPPED | OUTPATIENT
Start: 2017-10-13 | End: 2017-11-10 | Stop reason: SDUPTHER

## 2017-10-13 RX ORDER — MELOXICAM 15 MG/1
15 TABLET ORAL DAILY
Qty: 30 TABLET | Refills: 0 | Status: SHIPPED | OUTPATIENT
Start: 2017-10-13 | End: 2017-11-10 | Stop reason: SDUPTHER

## 2017-10-13 NOTE — PROGRESS NOTES
Date: 10/13/2017    Supervising Physician: Kvng Whittaker M.D.    Date of Surgery: 8/3/2017    Procedure: C5/6 disc replacement    History: Sammie Bee is seen today for follow-up following the above listed procedure. Overall the patient is doing well but today notes her pain is improving with PT.   She is taking hydrocodone and robaxin.  she denies fever, chills, and sweats since the time of the surgery.        Exam:  Incision is healed.   There is no sign of infection. Neuro exam is stable. No signs of DVT.    Radiographs: imaging shows arthroplasty in place, good positioning and alignment.  No evidence of failure.     Assessment/Plan: 10 weeks post op.    Doing well postoperatively. Refill norco given today.  Referral for PT at [facility] today. Follow up in 8 weeks    I will plan to see the patient back for the next postop visit in 8 weeks    Thank you for the opportunity to participate in this patient's care. Please give me a call if there are any concerns or questions.

## 2017-11-10 RX ORDER — HYDROCODONE BITARTRATE AND ACETAMINOPHEN 10; 325 MG/1; MG/1
1 TABLET ORAL EVERY 6 HOURS PRN
Qty: 90 TABLET | Refills: 0 | Status: SHIPPED | OUTPATIENT
Start: 2017-11-10 | End: 2017-12-08 | Stop reason: SDUPTHER

## 2017-11-10 RX ORDER — MELOXICAM 15 MG/1
15 TABLET ORAL DAILY
Qty: 30 TABLET | Refills: 0 | Status: SHIPPED | OUTPATIENT
Start: 2017-11-10 | End: 2017-12-08

## 2017-11-10 RX ORDER — METHOCARBAMOL 750 MG/1
750 TABLET, FILM COATED ORAL 4 TIMES DAILY
Qty: 60 TABLET | Refills: 0 | Status: SHIPPED | OUTPATIENT
Start: 2017-11-10 | End: 2017-12-01 | Stop reason: SDUPTHER

## 2017-12-01 RX ORDER — METHOCARBAMOL 750 MG/1
750 TABLET, FILM COATED ORAL 4 TIMES DAILY
Qty: 60 TABLET | Refills: 0 | Status: SHIPPED | OUTPATIENT
Start: 2017-12-01 | End: 2017-12-08 | Stop reason: SDUPTHER

## 2017-12-08 ENCOUNTER — OFFICE VISIT (OUTPATIENT)
Dept: ORTHOPEDICS | Facility: CLINIC | Age: 47
End: 2017-12-08

## 2017-12-08 DIAGNOSIS — Z98.890 S/P CERVICAL DISC REPLACEMENT: Primary | ICD-10-CM

## 2017-12-08 PROCEDURE — 99999 PR PBB SHADOW E&M-EST. PATIENT-LVL II: CPT | Mod: PBBFAC,,, | Performed by: PHYSICIAN ASSISTANT

## 2017-12-08 PROCEDURE — 99212 OFFICE O/P EST SF 10 MIN: CPT | Mod: PBBFAC | Performed by: PHYSICIAN ASSISTANT

## 2017-12-08 PROCEDURE — 99213 OFFICE O/P EST LOW 20 MIN: CPT | Mod: S$PBB,,, | Performed by: PHYSICIAN ASSISTANT

## 2017-12-08 RX ORDER — HYDROCODONE BITARTRATE AND ACETAMINOPHEN 10; 325 MG/1; MG/1
1 TABLET ORAL EVERY 6 HOURS PRN
Qty: 28 TABLET | Refills: 0 | Status: SHIPPED | OUTPATIENT
Start: 2017-12-08 | End: 2018-01-05

## 2017-12-08 RX ORDER — METHOCARBAMOL 750 MG/1
750 TABLET, FILM COATED ORAL 4 TIMES DAILY
Qty: 60 TABLET | Refills: 0 | Status: SHIPPED | OUTPATIENT
Start: 2017-12-08 | End: 2018-01-02 | Stop reason: SDUPTHER

## 2017-12-08 RX ORDER — DICLOFENAC SODIUM 75 MG/1
75 TABLET, DELAYED RELEASE ORAL 2 TIMES DAILY
Qty: 60 TABLET | Refills: 0 | Status: SHIPPED | OUTPATIENT
Start: 2017-12-08 | End: 2018-01-07

## 2017-12-08 NOTE — PROGRESS NOTES
Date: 12/08/2017    Supervising Physician: Kvng Whittaker M.D.    Date of Surgery: 8/3/2017    Procedure: C5/6 disc replacement    History: Sammie Bee is seen today for follow-up following the above listed procedure. Overall the patient is doing well but today notes her pain is improving with PT.   She is taking hydrocodone at night and mobic and robaxin during the day.  She says she is moving in the right direction.  She is pleased.   she denies fever, chills, and sweats since the time of the surgery.        Exam:  Incision is healed.   There is no sign of infection. Neuro exam is stable. No signs of DVT.    Radiographs: no new imaging today     Assessment/Plan:     Doing well postoperatively.  reviewed.  Refill norco given today.  This will be her final refill.  Continue PT as needed.  Follow up at a year from surgery.  Sooner as needed.     Thank you for the opportunity to participate in this patient's care. Please give me a call if there are any concerns or questions.

## 2018-01-02 ENCOUNTER — PATIENT MESSAGE (OUTPATIENT)
Dept: ORTHOPEDICS | Facility: CLINIC | Age: 48
End: 2018-01-02

## 2018-01-03 ENCOUNTER — TELEPHONE (OUTPATIENT)
Dept: ORTHOPEDICS | Facility: CLINIC | Age: 48
End: 2018-01-03

## 2018-01-03 DIAGNOSIS — Z98.890 S/P SPINAL SURGERY: Primary | ICD-10-CM

## 2018-01-03 RX ORDER — METHOCARBAMOL 750 MG/1
750 TABLET, FILM COATED ORAL 4 TIMES DAILY
Qty: 60 TABLET | Refills: 0 | Status: SHIPPED | OUTPATIENT
Start: 2018-01-03 | End: 2018-01-29 | Stop reason: SDUPTHER

## 2018-01-05 ENCOUNTER — TELEPHONE (OUTPATIENT)
Dept: ORTHOPEDICS | Facility: CLINIC | Age: 48
End: 2018-01-05

## 2018-01-05 ENCOUNTER — HOSPITAL ENCOUNTER (OUTPATIENT)
Dept: RADIOLOGY | Facility: HOSPITAL | Age: 48
Discharge: HOME | End: 2018-01-05
Attending: ORTHOPAEDIC SURGERY

## 2018-01-05 ENCOUNTER — OFFICE VISIT (OUTPATIENT)
Dept: ORTHOPEDICS | Facility: CLINIC | Age: 48
End: 2018-01-05

## 2018-01-05 VITALS — HEIGHT: 60 IN | BODY MASS INDEX: 21.2 KG/M2 | WEIGHT: 108 LBS

## 2018-01-05 DIAGNOSIS — G25.89 SCAPULAR DYSKINESIS: Primary | ICD-10-CM

## 2018-01-05 DIAGNOSIS — M54.2 CERVICAL SPINE PAIN: Primary | ICD-10-CM

## 2018-01-05 DIAGNOSIS — M54.2 CERVICAL SPINE PAIN: ICD-10-CM

## 2018-01-05 PROCEDURE — 72040 X-RAY EXAM NECK SPINE 2-3 VW: CPT | Mod: 26,,, | Performed by: RADIOLOGY

## 2018-01-05 PROCEDURE — 99213 OFFICE O/P EST LOW 20 MIN: CPT | Mod: S$PBB,,, | Performed by: ORTHOPAEDIC SURGERY

## 2018-01-05 PROCEDURE — 72141 MRI NECK SPINE W/O DYE: CPT | Mod: TC

## 2018-01-05 PROCEDURE — 72141 MRI NECK SPINE W/O DYE: CPT | Mod: 26,,, | Performed by: RADIOLOGY

## 2018-01-05 PROCEDURE — 99212 OFFICE O/P EST SF 10 MIN: CPT | Mod: PBBFAC,25 | Performed by: ORTHOPAEDIC SURGERY

## 2018-01-05 PROCEDURE — 99999 PR PBB SHADOW E&M-EST. PATIENT-LVL II: CPT | Mod: PBBFAC,,, | Performed by: ORTHOPAEDIC SURGERY

## 2018-01-05 PROCEDURE — 72040 X-RAY EXAM NECK SPINE 2-3 VW: CPT | Mod: TC

## 2018-01-05 RX ORDER — DEXTROMETHORPHAN HYDROBROMIDE, GUAIFENESIN 5; 100 MG/5ML; MG/5ML
650 LIQUID ORAL EVERY 8 HOURS
COMMUNITY

## 2018-01-05 RX ORDER — IBUPROFEN 200 MG
200 TABLET ORAL EVERY 6 HOURS PRN
COMMUNITY

## 2018-01-10 NOTE — PROGRESS NOTES
Date: 01/10/2018    Supervising Physician: Kvng Whittaker M.D.    Date of Surgery: 8/3/2017    Procedure: C5/6 disc replacement    History: Sammie Bee is seen today for follow-up following the above listed procedure. Although recently she has been going in the right direction she has had a tough time over the past few weeks. Primarily she reports R periscapular pain which she rates as a 7. There is also Bilateral hand paresthesias.    She also notes neck stiffness and L paraspinal aching.    She has tried massage, chiropractor, TENS, PT including dry needling and ultrasound without relief.    She does have some relief with stretching.    Exam:  Incision is healed.   There is no sign of infection. Neuro exam is stable. No signs of DVT. She does have notable brisk bilateral biceps reflexes.     She has significant R scapulothoracic crepitus and pain.    Radiographs: New MRI and radiographs demonstrate no recurrent disc herniations. There is no evidence of prosthesis migration or failure.    Assessment/Plan:     I do not see an issues with her prosthesis. I would like he to see one of my sports colleagues to evaluate her R scapulothoracic dyskensia.

## 2018-01-16 ENCOUNTER — HOSPITAL ENCOUNTER (OUTPATIENT)
Dept: RADIOLOGY | Facility: HOSPITAL | Age: 48
Discharge: HOME | End: 2018-01-16
Attending: ORTHOPAEDIC SURGERY

## 2018-01-16 ENCOUNTER — OFFICE VISIT (OUTPATIENT)
Dept: SPORTS MEDICINE | Facility: CLINIC | Age: 48
End: 2018-01-16

## 2018-01-16 VITALS — HEIGHT: 60 IN | BODY MASS INDEX: 21.2 KG/M2 | WEIGHT: 108 LBS

## 2018-01-16 DIAGNOSIS — G25.89 SCAPULAR DYSKINESIS: ICD-10-CM

## 2018-01-16 DIAGNOSIS — M25.519 SHOULDER PAIN, UNSPECIFIED CHRONICITY, UNSPECIFIED LATERALITY: ICD-10-CM

## 2018-01-16 DIAGNOSIS — M25.519 SHOULDER PAIN, UNSPECIFIED CHRONICITY, UNSPECIFIED LATERALITY: Primary | ICD-10-CM

## 2018-01-16 PROCEDURE — 99203 OFFICE O/P NEW LOW 30 MIN: CPT | Mod: S$PBB,,, | Performed by: ORTHOPAEDIC SURGERY

## 2018-01-16 PROCEDURE — 73030 X-RAY EXAM OF SHOULDER: CPT | Mod: TC,FY,PO,RT

## 2018-01-16 PROCEDURE — 99999 PR PBB SHADOW E&M-EST. PATIENT-LVL III: CPT | Mod: PBBFAC,,, | Performed by: ORTHOPAEDIC SURGERY

## 2018-01-16 PROCEDURE — 73030 X-RAY EXAM OF SHOULDER: CPT | Mod: 26,RT,, | Performed by: RADIOLOGY

## 2018-01-16 PROCEDURE — 99213 OFFICE O/P EST LOW 20 MIN: CPT | Mod: PBBFAC,25,PO | Performed by: ORTHOPAEDIC SURGERY

## 2018-01-16 NOTE — LETTER
January 18, 2018      Kvng Whittaker MD  [address]  [facility] [address]           [facility]  [address]  [facility] [address]  Phone: [phone]          Patient: Sammie Bee   MR Number: [id number]   YOB: 1970   Date of Visit: 1/16/2018       Dear Dr. Kvng Whittaker:    Thank you for referring Sammie Bee to me for evaluation. Attached you will find relevant portions of my assessment and plan of care.    If you have questions, please do not hesitate to call me. I look forward to following Sammie eBe along with you.    Sincerely,    Matt Addison MD    Enclosure  CC:  No Recipients    If you would like to receive this communication electronically, please contact [email or web address] or [phone] to request more information on [facility] Link access.    For providers and/or their staff who would like to refer a patient to [facility], please contact us through our one-stop-shop provider referral line, [facility] , at [phone].    If you feel you have received this communication in error or would no longer like to receive these types of communications, please e-mail [email or web address]

## 2018-01-16 NOTE — PROGRESS NOTES
CC: right Shoulder pain    47 y.o. Female reports that the pain is severe and not responding to any conservative care.  Patient reports right periscapular pain for 1.5 years. Has seen massage therapist and chiropractor without relief.  Has also seen physical therapist    She reports that the pain is worse with overhead activity. It also bothers her at night.      PAST MEDICAL HISTORY: History reviewed. No pertinent past medical history.  PAST SURGICAL HISTORY: History reviewed. No pertinent surgical history.  FAMILY HISTORY: History reviewed. No pertinent family history.  SOCIAL HISTORY:   Social History     Social History    Marital status:      Spouse name: N/A    Number of children: N/A    Years of education: N/A     Occupational History    Not on file.     Social History Main Topics    Smoking status: Never Smoker    Smokeless tobacco: Never Used    Alcohol use No    Drug use: No    Sexual activity: Not on file     Other Topics Concern    Not on file     Social History Narrative    No narrative on file       MEDICATIONS:   Current Outpatient Prescriptions:     ESTROGENS, CONJUGATED (PREMARIN ORAL), Take 0.125 mg by mouth nightly. , Disp: , Rfl:     ibuprofen (ADVIL,MOTRIN) 200 MG tablet, Take 200 mg by mouth every 6 (six) hours as needed for Pain., Disp: , Rfl:     methocarbamol (ROBAXIN) 750 MG Tab, Take 1 tablet (750 mg total) by mouth 4 (four) times daily., Disp: 60 tablet, Rfl: 0    mupirocin calcium 2% nasl oint (BACTROBAN) 2 % Oint, by Nasal route 2 (two) times daily. Apply to inside of both nostrils twice daily starting 48 hours before surgery and continuing for 5 days after surgery.  Topical ointment may be substituted if needed., Disp: 1 Tube, Rfl: 0    ondansetron (ZOFRAN) 8 MG tablet, Take 1 tablet (8 mg total) by mouth every 8 (eight) hours as needed for Nausea., Disp: 30 tablet, Rfl: 0    progesterone (PROMETRIUM) 200 MG capsule, Take 200 mg by mouth every evening. , Disp: ,  Rfl:     acetaminophen (TYLENOL ARTHRITIS PAIN) 650 MG TbSR, Take 650 mg by mouth every 8 (eight) hours., Disp: , Rfl:     docusate sodium (COLACE) 100 MG capsule, Take 1 capsule (100 mg total) by mouth 2 (two) times daily., Disp: 60 capsule, Rfl: 0  ALLERGIES:   Review of patient's allergies indicates:   Allergen Reactions    Latex, natural rubber        VITAL SIGNS: Ht 5' (1.524 m)   Wt 49 kg (108 lb)   BMI 21.09 kg/m²      Review of Systems   Constitution: Negative. Negative for chills, fever and night sweats.   HENT: Negative for congestion and headaches.    Eyes: Negative for blurred vision, left vision loss and right vision loss.   Cardiovascular: Negative for chest pain and syncope.   Respiratory: Negative for cough and shortness of breath.    Endocrine: Negative for polydipsia, polyphagia and polyuria.   Hematologic/Lymphatic: Negative for bleeding problem. Does not bruise/bleed easily.   Skin: Negative for dry skin, itching and rash.   Musculoskeletal: Negative for falls and muscle weakness.   Gastrointestinal: Negative for abdominal pain and bowel incontinence.   Genitourinary: Negative for bladder incontinence and nocturia.   Neurological: Negative for disturbances in coordination, loss of balance and seizures.   Psychiatric/Behavioral: Negative for depression. The patient does not have insomnia.    Allergic/Immunologic: Negative for hives and persistent infections.       PHYSICAL EXAMINATION:  General:  The patient is alert and oriented x 3.  Mood is pleasant.  Observation of ears, eyes and nose reveal no gross abnormalities.  HEENT: NCAT, sclera nonicteric  Lungs: Respirations are equal and unlabored.  Gait is coordinated. Patient can toe walk and heel walk without difficulty.  Cardiovascular: There are no swelling or varicosities present.   Lymphatic: Negative for adenopathy       right Shoulder / Upper Extremity Exam    OBSERVATION:     Swelling  none  Deformity  none   Discoloration  none    Scapular winging none   Scars   none  Atrophy  none    TENDERNESS / CREPITUS (T/C):          T/C      T/C   Clavicle   -/-  SUPRAspinatus    -/-   AC Jt.    -/-  INFRAspinatus  -/-   SC Jt.    -/-  Deltoid    -/-   G. Tuberosity  -/-  LH BICEP groove  -/-   Acromion:  -/-  Midline Neck   -/-   Scapular Spine -/-  Trapezium   -/-   SMA Scapula  -/-  GH jt. line - post  -/-   Scapulothoracic  -/-         ROM: (* = with pain)  Right shoulder   Left shoulder        AROM (PROM)   AROM (PROM)   FE    150° (175°)     150° (175°)     ER at 0°    60°  (65°)    60°  (65°)   ER at 90° ABD  90°  (90°)    90°  (90°)   IR at 90°  ABD   NA  (40°)     NA  (40°)      IR (spine level)   T10     T10    STRENGTH: (* = with pain) RIGHT SHOULDER  LEFT SHOULDER   SCAPTION at 30   5/5    5/5    IR    5/5    5/5   ER    5/5    5/5   BICEPS   5/5    5/5   Deltoid    5/5    5/5     SIGNS:  Painful side       NEER   neg    OCARTERS  Neg   CHAVEZ   neg    SPEEDS  Neg   DROP ARM   neg   BELLY PRESS Neg   Superior escape none    LIFT-OFF  Neg   X-Body ADD    neg    MOVING VALGUS Neg      STABILITY TESTING    RIGHT SHOULDER   LEFT SHOULDER       Translation    Anterior  up face     up face    Posterior  up face    up face    Sulcus   < 10mm    < 10 mm    Signs    Apprehension   neg      Neg    Relocation   no change     no change    Jerk test  neg     Neg      EXTREMITY NEURO-VASCULAR EXAM    Sensation grossly intact to light touch all dermatomal regions.    DTR 2+ Biceps, Triceps, BR and Negative Anastasias sign   Grossly intact motor function at Elbow, Wrist and Hand   Distal pulses radial and ulnar 2+, brisk cap refill, symmetric.      NECK:  Painless FROM and spinous processes non-tender. Negative Spurlings sign.      OTHER FINDINGS:  Scapular dyskinesis  XRAYS:  Shoulder trauma series right,  were ordered and reviewed by me. No convincing fracture or dislocation is noted. The osseous structures appear well mineralized and well  aligned    1. Shoulder pain, right     Plan:       ASSESSMENT:  shoulder pain.    PT for scapular dyskinesis  EMG RUE  RTC 6 wks

## 2018-01-22 ENCOUNTER — TELEPHONE (OUTPATIENT)
Dept: SPORTS MEDICINE | Facility: CLINIC | Age: 48
End: 2018-01-22

## 2018-01-22 NOTE — TELEPHONE ENCOUNTER
Received a request from patient to release medical records to [facility].The request was sent to our medical record department.    Gina Becerrilsjamie [facility] Medicine

## 2018-01-29 RX ORDER — METHOCARBAMOL 750 MG/1
750 TABLET, FILM COATED ORAL 4 TIMES DAILY
Qty: 60 TABLET | Refills: 0 | Status: SHIPPED | OUTPATIENT
Start: 2018-01-29 | End: 2018-02-22 | Stop reason: SDUPTHER

## 2018-02-23 RX ORDER — METHOCARBAMOL 750 MG/1
750 TABLET, FILM COATED ORAL 4 TIMES DAILY
Qty: 60 TABLET | Refills: 0 | Status: SHIPPED | OUTPATIENT
Start: 2018-02-23 | End: 2018-03-15 | Stop reason: SDUPTHER

## 2018-03-14 ENCOUNTER — PATIENT MESSAGE (OUTPATIENT)
Dept: ORTHOPEDICS | Facility: CLINIC | Age: 48
End: 2018-03-14

## 2018-03-15 RX ORDER — METHOCARBAMOL 750 MG/1
750 TABLET, FILM COATED ORAL 4 TIMES DAILY
Qty: 60 TABLET | Refills: 0 | Status: SHIPPED | OUTPATIENT
Start: 2018-03-15 | End: 2018-04-05 | Stop reason: SDUPTHER

## 2018-03-15 RX ORDER — MELOXICAM 15 MG/1
15 TABLET ORAL DAILY
Qty: 30 TABLET | Refills: 0 | Status: SHIPPED | OUTPATIENT
Start: 2018-03-15 | End: 2018-04-14

## 2018-04-10 RX ORDER — METHOCARBAMOL 750 MG/1
750 TABLET, FILM COATED ORAL 4 TIMES DAILY
Qty: 60 TABLET | Refills: 0 | Status: SHIPPED | OUTPATIENT
Start: 2018-04-10 | End: 2018-04-27 | Stop reason: SDUPTHER

## 2018-04-27 ENCOUNTER — PATIENT MESSAGE (OUTPATIENT)
Dept: ORTHOPEDICS | Facility: CLINIC | Age: 48
End: 2018-04-27

## 2018-04-27 RX ORDER — MELOXICAM 15 MG/1
15 TABLET ORAL DAILY
Qty: 30 TABLET | Refills: 0 | Status: SHIPPED | OUTPATIENT
Start: 2018-04-27 | End: 2018-05-31 | Stop reason: SDUPTHER

## 2018-04-27 RX ORDER — METHOCARBAMOL 750 MG/1
750 TABLET, FILM COATED ORAL 4 TIMES DAILY
Qty: 60 TABLET | Refills: 0 | Status: SHIPPED | OUTPATIENT
Start: 2018-04-27 | End: 2018-05-14 | Stop reason: SDUPTHER

## 2018-05-14 ENCOUNTER — TELEPHONE (OUTPATIENT)
Dept: ORTHOPEDICS | Facility: CLINIC | Age: 48
End: 2018-05-14

## 2018-05-14 RX ORDER — METHOCARBAMOL 750 MG/1
750 TABLET, FILM COATED ORAL 4 TIMES DAILY
Qty: 60 TABLET | Refills: 0 | Status: SHIPPED | OUTPATIENT
Start: 2018-05-14 | End: 2018-05-31 | Stop reason: SDUPTHER

## 2018-05-14 NOTE — TELEPHONE ENCOUNTER
----- Message from Laina Brito PA-C sent at 5/14/2018  1:40 PM CDT -----  Contact: pt  Will you see if she is talking about Dr. Addison?      ----- Message -----  From: Milvia Ariza MA  Sent: 5/14/2018   1:38 PM  To: Laina Brito PA-C        ----- Message -----  From: Marcello Vyas  Sent: 5/14/2018   1:18 PM  To: Alisha Marina Staff              Name of Who is Calling: pt      What is the request in detail: in regards to needing direction as far as clearance for her neck problem. Pt states her shoulder doctor is asking for clearance for the patient's neck before servicing her       Can the clinic reply by [facility]: no      What Number to [facility] Back [facility]: [phone]

## 2018-05-14 NOTE — TELEPHONE ENCOUNTER
Spoke to pt and she was notified per Nighat Brito we will send a message to  for an appointment. Pt understood and was pleased.

## 2018-05-15 ENCOUNTER — PATIENT MESSAGE (OUTPATIENT)
Dept: SPINE | Facility: CLINIC | Age: 48
End: 2018-05-15

## 2018-05-31 RX ORDER — METHOCARBAMOL 750 MG/1
750 TABLET, FILM COATED ORAL 4 TIMES DAILY
Qty: 60 TABLET | Refills: 0 | Status: SHIPPED | OUTPATIENT
Start: 2018-05-31 | End: 2018-06-19 | Stop reason: SDUPTHER

## 2018-05-31 RX ORDER — MELOXICAM 15 MG/1
15 TABLET ORAL DAILY
Qty: 30 TABLET | Refills: 0 | Status: SHIPPED | OUTPATIENT
Start: 2018-05-31 | End: 2018-07-05 | Stop reason: SDUPTHER

## 2018-06-04 ENCOUNTER — OFFICE VISIT (OUTPATIENT)
Dept: ORTHOPEDICS | Facility: CLINIC | Age: 48
End: 2018-06-04

## 2018-06-04 VITALS — HEIGHT: 60 IN | WEIGHT: 110.56 LBS | BODY MASS INDEX: 21.71 KG/M2

## 2018-06-04 DIAGNOSIS — Z98.890 S/P CERVICAL DISC REPLACEMENT: Primary | ICD-10-CM

## 2018-06-04 PROCEDURE — 99212 OFFICE O/P EST SF 10 MIN: CPT | Mod: PBBFAC | Performed by: ORTHOPAEDIC SURGERY

## 2018-06-04 PROCEDURE — 99999 PR PBB SHADOW E&M-EST. PATIENT-LVL II: CPT | Mod: PBBFAC,,, | Performed by: ORTHOPAEDIC SURGERY

## 2018-06-04 PROCEDURE — 99213 OFFICE O/P EST LOW 20 MIN: CPT | Mod: S$PBB,,, | Performed by: ORTHOPAEDIC SURGERY

## 2018-06-07 PROBLEM — Z98.890 S/P CERVICAL DISC REPLACEMENT: Status: ACTIVE | Noted: 2018-06-07

## 2018-06-07 PROBLEM — M54.12 CERVICAL RADICULOPATHY: Status: RESOLVED | Noted: 2017-08-03 | Resolved: 2018-06-07

## 2018-06-07 NOTE — PROGRESS NOTES
Date of Surgery: 8/3/2017    Procedure: C5/6 disc replacement    History: Sammie Bee is seen today for follow-up following the above listed procedure. She has recently been diagnosed with a R shoulder SLAP tear and scaupthoracic dyskenisia. She is planning a R shoulder arthroscopic surgery with Cory Garnett.  She also notes neck stiffness and L paraspinal aching.    Exam:  Incision is healed.   There is no sign of infection. Neuro exam is stable. No signs of DVT. She does have notable brisk bilateral biceps reflexes.     Assessment/Plan:     Today we discussed options, she is almost a year from surgery. Her most recent radiographs and MRI are stable. I see no contraindication to shoulder surgery.    I spent 15 minutes with the patient of which greater than 1/2 the time was devoted to counciling the patient regarding treatment options.

## 2018-06-19 RX ORDER — METHOCARBAMOL 750 MG/1
750 TABLET, FILM COATED ORAL 4 TIMES DAILY
Qty: 60 TABLET | Refills: 0 | Status: SHIPPED | OUTPATIENT
Start: 2018-06-19 | End: 2018-07-12 | Stop reason: SDUPTHER

## 2018-07-05 RX ORDER — MELOXICAM 15 MG/1
15 TABLET ORAL DAILY
Qty: 30 TABLET | Refills: 0 | Status: SHIPPED | OUTPATIENT
Start: 2018-07-05

## 2018-07-12 RX ORDER — METHOCARBAMOL 750 MG/1
TABLET, FILM COATED ORAL
Qty: 60 TABLET | Refills: 0 | Status: SHIPPED | OUTPATIENT
Start: 2018-07-12 | End: 2018-07-27 | Stop reason: SDUPTHER

## 2018-07-27 RX ORDER — METHOCARBAMOL 750 MG/1
TABLET, FILM COATED ORAL
Qty: 60 TABLET | Refills: 0 | Status: SHIPPED | OUTPATIENT
Start: 2018-07-27 | End: 2018-08-16 | Stop reason: SDUPTHER

## 2018-08-20 RX ORDER — METHOCARBAMOL 750 MG/1
TABLET, FILM COATED ORAL
Qty: 60 TABLET | Refills: 0 | Status: SHIPPED | OUTPATIENT
Start: 2018-08-20 | End: 2018-09-07 | Stop reason: SDUPTHER

## 2018-09-07 RX ORDER — METHOCARBAMOL 750 MG/1
TABLET, FILM COATED ORAL
Qty: 60 TABLET | Refills: 0 | Status: SHIPPED | OUTPATIENT
Start: 2018-09-07

## 2019-12-12 ENCOUNTER — TELEPHONE (OUTPATIENT)
Dept: ORTHOPEDICS | Facility: CLINIC | Age: 49
End: 2019-12-12

## 2019-12-12 DIAGNOSIS — M50.30 DDD (DEGENERATIVE DISC DISEASE), CERVICAL: Primary | ICD-10-CM

## 2020-01-08 ENCOUNTER — HOSPITAL ENCOUNTER (OUTPATIENT)
Dept: RADIOLOGY | Facility: HOSPITAL | Age: 50
Discharge: HOME | End: 2020-01-08
Attending: PHYSICIAN ASSISTANT

## 2020-01-08 ENCOUNTER — OFFICE VISIT (OUTPATIENT)
Dept: ORTHOPEDICS | Facility: CLINIC | Age: 50
End: 2020-01-08

## 2020-01-08 ENCOUNTER — TELEPHONE (OUTPATIENT)
Dept: ORTHOPEDICS | Facility: CLINIC | Age: 50
End: 2020-01-08

## 2020-01-08 VITALS — HEIGHT: 60 IN | BODY MASS INDEX: 22.03 KG/M2 | WEIGHT: 112.19 LBS

## 2020-01-08 DIAGNOSIS — Z98.890 S/P CERVICAL DISC REPLACEMENT: Primary | ICD-10-CM

## 2020-01-08 DIAGNOSIS — M50.30 DDD (DEGENERATIVE DISC DISEASE), CERVICAL: ICD-10-CM

## 2020-01-08 DIAGNOSIS — M54.12 CERVICAL RADICULOPATHY: ICD-10-CM

## 2020-01-08 PROCEDURE — 99213 OFFICE O/P EST LOW 20 MIN: CPT | Mod: PBBFAC,25 | Performed by: PHYSICIAN ASSISTANT

## 2020-01-08 PROCEDURE — 99999 PR PBB SHADOW E&M-EST. PATIENT-LVL III: CPT | Mod: PBBFAC,,, | Performed by: PHYSICIAN ASSISTANT

## 2020-01-08 PROCEDURE — 99213 OFFICE O/P EST LOW 20 MIN: CPT | Mod: S$PBB,,, | Performed by: PHYSICIAN ASSISTANT

## 2020-01-08 PROCEDURE — 72050 X-RAY EXAM NECK SPINE 4/5VWS: CPT | Mod: TC

## 2020-01-08 PROCEDURE — 72050 X-RAY EXAM NECK SPINE 4/5VWS: CPT | Mod: 26,,, | Performed by: RADIOLOGY

## 2020-01-08 PROCEDURE — 99999 PR PBB SHADOW E&M-EST. PATIENT-LVL III: ICD-10-PCS | Mod: PBBFAC,,, | Performed by: PHYSICIAN ASSISTANT

## 2020-01-08 PROCEDURE — 72050 XR CERVICAL SPINE AP LAT WITH FLEX EXTEN: ICD-10-PCS | Mod: 26,,, | Performed by: RADIOLOGY

## 2020-01-08 PROCEDURE — 99213 PR OFFICE/OUTPT VISIT, EST, LEVL III, 20-29 MIN: ICD-10-PCS | Mod: S$PBB,,, | Performed by: PHYSICIAN ASSISTANT

## 2020-01-08 NOTE — PROGRESS NOTES
Date of Surgery: 8/3/2017    Procedure: C5/6 disc replacement    History: Sammie Bee is seen today for follow-up following the above listed procedure. She had bilateral rotator cuff repairs in 2018.  In July 2019 she started having increased neck and right arm pain.  There is radiation into the thumb, index and middle fingers.  There is numbness and tingling.  There is subjective weakness, particularly  strength.  She has been going to physical therapy with little relief.      Exam:  Incision is healed.   There is no sign of infection. Neuro exam is stable. No signs of DVT. She does have notable brisk bilateral biceps reflexes.       Imaging today shows arthroplasty in place, good positioning and alignment.    Assessment/Plan:   We will get a new MRI.  Follow up after the MRI.

## 2020-01-08 NOTE — TELEPHONE ENCOUNTER
Spoke with pt about another Mri order told her I would send msg to sujata and she'll get back with her.

## 2020-01-09 DIAGNOSIS — Z98.890 S/P CERVICAL DISC REPLACEMENT: Primary | ICD-10-CM

## 2020-01-09 DIAGNOSIS — M54.12 CERVICAL RADICULOPATHY: ICD-10-CM

## 2020-02-03 ENCOUNTER — OFFICE VISIT (OUTPATIENT)
Dept: ORTHOPEDICS | Facility: CLINIC | Age: 50
End: 2020-02-03

## 2020-02-03 VITALS — BODY MASS INDEX: 21.99 KG/M2 | WEIGHT: 112 LBS | HEIGHT: 60 IN

## 2020-02-03 DIAGNOSIS — Z98.890 S/P CERVICAL DISC REPLACEMENT: Primary | ICD-10-CM

## 2020-02-03 DIAGNOSIS — M54.12 CERVICAL RADICULOPATHY: ICD-10-CM

## 2020-02-03 PROCEDURE — 99999 PR PBB SHADOW E&M-EST. PATIENT-LVL III: CPT | Mod: PBBFAC,,, | Performed by: PHYSICIAN ASSISTANT

## 2020-02-03 PROCEDURE — 99213 PR OFFICE/OUTPT VISIT, EST, LEVL III, 20-29 MIN: ICD-10-PCS | Mod: S$PBB,,, | Performed by: PHYSICIAN ASSISTANT

## 2020-02-03 PROCEDURE — 99213 OFFICE O/P EST LOW 20 MIN: CPT | Mod: S$PBB,,, | Performed by: PHYSICIAN ASSISTANT

## 2020-02-03 PROCEDURE — 99999 PR PBB SHADOW E&M-EST. PATIENT-LVL III: ICD-10-PCS | Mod: PBBFAC,,, | Performed by: PHYSICIAN ASSISTANT

## 2020-02-03 PROCEDURE — 99213 OFFICE O/P EST LOW 20 MIN: CPT | Mod: PBBFAC | Performed by: PHYSICIAN ASSISTANT

## 2020-02-03 NOTE — PROGRESS NOTES
Date of Surgery: 8/3/2017    Procedure: C5/6 disc replacement    History: Sammie Bee is seen today for follow-up following the above listed procedure. She had bilateral rotator cuff repairs in 2018.  In July 2019 she started having increased neck and right arm pain.  There is radiation into the thumb, index and middle fingers.  There is numbness and tingling.  There is subjective weakness, particularly  strength.  She has been going to physical therapy with little relief.   She presents today for MRI results.    Exam:  Incision is healed.   There is no sign of infection. Neuro exam is stable. No signs of DVT. She does have notable brisk bilateral biceps reflexes.       Imaging today shows arthroplasty in place, good positioning and alignment.  MRI cervical spine from an outside facility shows no significant spinal canal or neural foraminal narrowing.    Assessment/Plan:   Discussed with Dr. Whittaker.  There is no surgical intervention indicated.  Referral for epidural injection placed today.  Follow up 2 weeks after injection if symptoms persist.

## 2020-02-04 ENCOUNTER — TELEPHONE (OUTPATIENT)
Dept: PAIN MEDICINE | Facility: CLINIC | Age: 50
End: 2020-02-04

## 2020-02-05 ENCOUNTER — TELEPHONE (OUTPATIENT)
Dept: PAIN MEDICINE | Facility: CLINIC | Age: 50
End: 2020-02-05

## 2020-02-05 DIAGNOSIS — M54.12 CERVICAL RADICULOPATHY: Primary | ICD-10-CM

## 2020-02-13 ENCOUNTER — HOSPITAL ENCOUNTER (OUTPATIENT)
Facility: OTHER | Age: 50
Discharge: HOME | End: 2020-02-13
Attending: ANESTHESIOLOGY | Admitting: ANESTHESIOLOGY

## 2020-02-13 VITALS
DIASTOLIC BLOOD PRESSURE: 61 MMHG | RESPIRATION RATE: 16 BRPM | BODY MASS INDEX: 21.6 KG/M2 | WEIGHT: 110 LBS | HEART RATE: 79 BPM | HEIGHT: 60 IN | TEMPERATURE: 98 F | OXYGEN SATURATION: 100 % | SYSTOLIC BLOOD PRESSURE: 127 MMHG

## 2020-02-13 DIAGNOSIS — G89.4 CHRONIC PAIN SYNDROME: ICD-10-CM

## 2020-02-13 DIAGNOSIS — M54.12 CERVICAL RADICULOPATHY: Primary | ICD-10-CM

## 2020-02-13 DIAGNOSIS — G89.29 CHRONIC PAIN: ICD-10-CM

## 2020-02-13 PROCEDURE — 62321 NJX INTERLAMINAR CRV/THRC: CPT | Mod: ,,, | Performed by: ANESTHESIOLOGY

## 2020-02-13 PROCEDURE — 25000003 PHARM REV CODE 250: Performed by: ANESTHESIOLOGY

## 2020-02-13 PROCEDURE — 62321 NJX INTERLAMINAR CRV/THRC: CPT | Performed by: ANESTHESIOLOGY

## 2020-02-13 PROCEDURE — 63600175 PHARM REV CODE 636 W HCPCS: Performed by: ANESTHESIOLOGY

## 2020-02-13 PROCEDURE — 25500020 PHARM REV CODE 255: Performed by: ANESTHESIOLOGY

## 2020-02-13 PROCEDURE — 63600175 PHARM REV CODE 636 W HCPCS: Performed by: SURGERY

## 2020-02-13 PROCEDURE — 62321 PR INJ CERV/THORAC, W/GUIDANCE: ICD-10-PCS | Mod: ,,, | Performed by: ANESTHESIOLOGY

## 2020-02-13 RX ORDER — MIDAZOLAM HYDROCHLORIDE 1 MG/ML
INJECTION INTRAMUSCULAR; INTRAVENOUS
Status: DISCONTINUED | OUTPATIENT
Start: 2020-02-13 | End: 2020-02-13 | Stop reason: HOSPADM

## 2020-02-13 RX ORDER — SODIUM CHLORIDE 9 MG/ML
500 INJECTION, SOLUTION INTRAVENOUS CONTINUOUS
Status: DISCONTINUED | OUTPATIENT
Start: 2020-02-13 | End: 2020-02-13 | Stop reason: HOSPADM

## 2020-02-13 RX ORDER — LIDOCAINE HYDROCHLORIDE 10 MG/ML
INJECTION, SOLUTION EPIDURAL; INFILTRATION; INTRACAUDAL; PERINEURAL
Status: DISCONTINUED | OUTPATIENT
Start: 2020-02-13 | End: 2020-02-13 | Stop reason: HOSPADM

## 2020-02-13 RX ORDER — LIDOCAINE HYDROCHLORIDE 10 MG/ML
INJECTION INFILTRATION; PERINEURAL
Status: DISCONTINUED | OUTPATIENT
Start: 2020-02-13 | End: 2020-02-13 | Stop reason: HOSPADM

## 2020-02-13 RX ORDER — DEXAMETHASONE SODIUM PHOSPHATE 100 MG/10ML
INJECTION INTRAMUSCULAR; INTRAVENOUS
Status: DISCONTINUED | OUTPATIENT
Start: 2020-02-13 | End: 2020-02-13 | Stop reason: HOSPADM

## 2020-02-13 RX ORDER — FENTANYL CITRATE 50 UG/ML
INJECTION, SOLUTION INTRAMUSCULAR; INTRAVENOUS
Status: DISCONTINUED | OUTPATIENT
Start: 2020-02-13 | End: 2020-02-13 | Stop reason: HOSPADM

## 2020-02-13 RX ADMIN — SODIUM CHLORIDE 500 ML: 0.9 INJECTION, SOLUTION INTRAVENOUS at 10:02

## 2020-02-13 NOTE — DISCHARGE SUMMARY
Discharge Note  Short Stay      SUMMARY     Admit Date: 2/13/2020    Attending Physician: Gm Ortiz      Discharge Physician: Gm Ortiz      Discharge Date: 2/13/2020 11:06 AM    Procedure(s) (LRB):  CERVICAL/THORACIC JANNETTE DIRECT REFERRAL (N/A)    Final Diagnosis: Cervical radiculopathy [M54.12]    Disposition: Home or self care    Patient Instructions:   Current Discharge Medication List      CONTINUE these medications which have NOT CHANGED    Details   acetaminophen (TYLENOL ARTHRITIS PAIN) 650 MG TbSR Take 650 mg by mouth every 8 (eight) hours.      docusate sodium (COLACE) 100 MG capsule Take 1 capsule (100 mg total) by mouth 2 (two) times daily.  Qty: 60 capsule, Refills: 0      ESTROGENS, CONJUGATED (PREMARIN ORAL) Take 0.125 mg by mouth nightly.       ibuprofen (ADVIL,MOTRIN) 200 MG tablet Take 200 mg by mouth every 6 (six) hours as needed for Pain.      meloxicam (MOBIC) 15 MG tablet Take 1 tablet (15 mg total) by mouth once daily.  Qty: 30 tablet, Refills: 0      methocarbamol (ROBAXIN) 750 MG Tab TAKE ONE TABLET BY MOUTH 4 TIMES DAILY  Qty: 60 tablet, Refills: 0      mupirocin calcium 2% nasl oint (BACTROBAN) 2 % Oint by Nasal route 2 (two) times daily. Apply to inside of both nostrils twice daily starting 48 hours before surgery and continuing for 5 days after surgery.    Topical ointment may be substituted if needed.  Qty: 1 Tube, Refills: 0    Associated Diagnoses: S/P spinal surgery      progesterone (PROMETRIUM) 200 MG capsule Take 200 mg by mouth every evening.                  Discharge Diagnosis: Cervical radiculopathy [M54.12]  Condition on Discharge: Stable with no complications to procedure   Diet on Discharge: Same as before.  Activity: as per instruction sheet.  Discharge to: Home with a responsible adult.  Follow up: 2-4 weeks       Please call my office or pager at [phone] if experienced any weakness or loss of sensation, fever > 101.5, pain uncontrolled with oral medications,  persistent nausea/vomiting/or diarrhea, redness or drainage from the incisions, or any other worrisome concerns. If physician on call was not reached or could not communicate with our office for any reason please go to the nearest emergency department

## 2020-02-13 NOTE — OP NOTE
Cervical Interlaminar Epidural Steroid Injection under fluoroscopic guidance.  Time-out taken to identify patient and procedure side prior to starting the procedure.   I attest that I have reviewed the patient's home medications prior to the procedure and no contraindication have been identified. I  re-evaluated the patient after the patient was positioned for the procedure in the procedure room immediately before the procedural time-out. The vital signs are current and represent the current state of the patient which has not significantly changed since the preprocedure assessment.                                                              Date of Service: 02/13/2020    PCP: Matt Vergara MD    Referring Physician:    PROCEDURE: C7-T1 cervical interlaminar epidural steroid injection under fluoroscopy.  REASONS FOR PROCEDURE: Cervical radiculopathy [M54.12]  1. Cervical radiculopathy    2. Chronic pain syndrome    3. Chronic pain      POSTOP DIAGNOSIS:  Cervical radiculopathy [M54.12]     1. Cervical radiculopathy    2. Chronic pain syndrome    3. Chronic pain      PHYSICIAN: Gm Ortiz MD  ASSISTANTS: Rafael Aldana M.D. anesthesia resident       MEDICATIONS INJECTED:  Preservative-free dexamethasone 10mg and Xylocaine 1% MPF 1ml.  This was followed by a slow injection of 4 mL sterile, preservative-free normal saline.    LOCAL ANESTHETIC USED: Xylocaine 1% 9ml with Sodium Bicarbonate 1ml.     SEDATION MEDICATIONS: local/IV sedation: Versed 2 mg and fentanyl 50 mcg IV.  Conscious sedation ordered by MD.  Patient reevaluated and sedation administered by MD and monitored by RN.  Total sedation time was less than 15 min. (See nurse documentation and case log for sedation time)    COMPLICATIONS:  none.    ESTIMATED BLOOD LOSS: none.    TECHNIQUE:  With the patient laying in a prone position with the neck in a flexed forward position, the area was prepped and draped in the usual sterile fashion using  ChloraPrep and a fenestrated drape.  The area was determined under fluoroscopic guidance.  Local anesthetic was given using a 27-gauge needle by raising a wheal and going down to the osseous elements of the cervical spine.  A 3.5 inch 20-gauge Touhy needle was introduced under fluoroscopic guidance to meet the lamina of T1.  The needle was then hinged under the lamina then advanced using loss of resistance technique.  Once the tip of the needle was in the desired position, the contrast dye Omnipaque was injected to determine placement and no vascular runoff.  Digital subtraction was employed to confirm that there is no vascular runoff.  The steroid was then injected slowly followed by a slow injection of the 4 mL of the sterile preservative-free normal saline.  The patient tolerated the procedure well.    PAIN BEFORE THE PROCEDURE: 7-9/10    PAIN AFTER THE PROCEDURE: 0/10    The patient was monitored after the procedure and was given post-procedure and discharge instructions to follow at home. The patient was discharged in a stable condition

## 2020-02-13 NOTE — DISCHARGE INSTRUCTIONS

## 2020-02-13 NOTE — PROGRESS NOTES
Discharge instructions given. Pt and family verbalized understanding. Vss. Dressing c/d/i. Ok to dc home

## 2020-02-27 ENCOUNTER — PATIENT MESSAGE (OUTPATIENT)
Dept: ORTHOPEDICS | Facility: CLINIC | Age: 50
End: 2020-02-27

## 2020-03-10 ENCOUNTER — PATIENT MESSAGE (OUTPATIENT)
Dept: ORTHOPEDICS | Facility: CLINIC | Age: 50
End: 2020-03-10

## 2020-03-10 DIAGNOSIS — M54.12 CERVICAL RADICULOPATHY: Primary | ICD-10-CM

## 2020-03-10 DIAGNOSIS — Z98.890 S/P CERVICAL DISC REPLACEMENT: ICD-10-CM

## 2020-03-11 ENCOUNTER — TELEPHONE (OUTPATIENT)
Dept: PAIN MEDICINE | Facility: CLINIC | Age: 50
End: 2020-03-11

## 2020-03-11 DIAGNOSIS — M54.12 CERVICAL RADICULOPATHY: Primary | ICD-10-CM

## 2020-03-16 ENCOUNTER — HOSPITAL ENCOUNTER (OUTPATIENT)
Facility: OTHER | Age: 50
Discharge: HOME | End: 2020-03-16
Attending: ANESTHESIOLOGY | Admitting: ANESTHESIOLOGY

## 2020-03-16 VITALS
HEIGHT: 60 IN | SYSTOLIC BLOOD PRESSURE: 120 MMHG | DIASTOLIC BLOOD PRESSURE: 72 MMHG | RESPIRATION RATE: 18 BRPM | BODY MASS INDEX: 21.4 KG/M2 | HEART RATE: 72 BPM | OXYGEN SATURATION: 100 % | TEMPERATURE: 98 F | WEIGHT: 109 LBS

## 2020-03-16 DIAGNOSIS — Z98.890 S/P CERVICAL DISC REPLACEMENT: ICD-10-CM

## 2020-03-16 DIAGNOSIS — G89.4 CHRONIC PAIN SYNDROME: ICD-10-CM

## 2020-03-16 DIAGNOSIS — M54.12 CERVICAL RADICULOPATHY: Primary | ICD-10-CM

## 2020-03-16 PROCEDURE — 63600175 PHARM REV CODE 636 W HCPCS: Performed by: ANESTHESIOLOGY

## 2020-03-16 PROCEDURE — 62321 PR INJ CERV/THORAC, W/GUIDANCE: ICD-10-PCS | Mod: ,,, | Performed by: ANESTHESIOLOGY

## 2020-03-16 PROCEDURE — 62321 NJX INTERLAMINAR CRV/THRC: CPT | Performed by: ANESTHESIOLOGY

## 2020-03-16 PROCEDURE — 62321 NJX INTERLAMINAR CRV/THRC: CPT | Mod: ,,, | Performed by: ANESTHESIOLOGY

## 2020-03-16 PROCEDURE — 25000003 PHARM REV CODE 250: Performed by: ANESTHESIOLOGY

## 2020-03-16 RX ORDER — LIDOCAINE HYDROCHLORIDE 10 MG/ML
INJECTION INFILTRATION; PERINEURAL
Status: DISCONTINUED | OUTPATIENT
Start: 2020-03-16 | End: 2020-03-16 | Stop reason: HOSPADM

## 2020-03-16 RX ORDER — DEXAMETHASONE SODIUM PHOSPHATE 100 MG/10ML
INJECTION INTRAMUSCULAR; INTRAVENOUS
Status: DISCONTINUED | OUTPATIENT
Start: 2020-03-16 | End: 2020-03-16 | Stop reason: HOSPADM

## 2020-03-16 RX ORDER — MIDAZOLAM HYDROCHLORIDE 1 MG/ML
INJECTION INTRAMUSCULAR; INTRAVENOUS
Status: DISCONTINUED | OUTPATIENT
Start: 2020-03-16 | End: 2020-03-16 | Stop reason: HOSPADM

## 2020-03-16 RX ORDER — LIDOCAINE HYDROCHLORIDE 10 MG/ML
INJECTION, SOLUTION EPIDURAL; INFILTRATION; INTRACAUDAL; PERINEURAL
Status: DISCONTINUED | OUTPATIENT
Start: 2020-03-16 | End: 2020-03-16 | Stop reason: HOSPADM

## 2020-03-16 RX ORDER — FENTANYL CITRATE 50 UG/ML
INJECTION, SOLUTION INTRAMUSCULAR; INTRAVENOUS
Status: DISCONTINUED | OUTPATIENT
Start: 2020-03-16 | End: 2020-03-16 | Stop reason: HOSPADM

## 2020-03-16 RX ORDER — SODIUM CHLORIDE 9 MG/ML
500 INJECTION, SOLUTION INTRAVENOUS CONTINUOUS
Status: DISCONTINUED | OUTPATIENT
Start: 2020-03-16 | End: 2020-03-16 | Stop reason: HOSPADM

## 2020-03-16 RX ADMIN — SODIUM CHLORIDE 500 ML: 0.9 INJECTION, SOLUTION INTRAVENOUS at 07:03

## 2020-03-16 NOTE — DISCHARGE SUMMARY
Discharge Note  Short Stay      SUMMARY     Admit Date: 3/16/2020    Attending Physician: Gm Ortiz      Discharge Physician: Gm Ortiz      Discharge Date: 3/16/2020 9:13 AM    Procedure(s) (LRB):  CERVICAL/THORACIC JANNETTE DIRECT REFERRAL (N/A)    Final Diagnosis: Cervical radiculopathy [M54.12]    Disposition: Home or self care    Patient Instructions:   Current Discharge Medication List      CONTINUE these medications which have NOT CHANGED    Details   acetaminophen (TYLENOL ARTHRITIS PAIN) 650 MG TbSR Take 650 mg by mouth every 8 (eight) hours.      ESTROGENS, CONJUGATED (PREMARIN ORAL) Take 0.125 mg by mouth nightly.       ibuprofen (ADVIL,MOTRIN) 200 MG tablet Take 200 mg by mouth every 6 (six) hours as needed for Pain.      meloxicam (MOBIC) 15 MG tablet Take 1 tablet (15 mg total) by mouth once daily.  Qty: 30 tablet, Refills: 0      methocarbamol (ROBAXIN) 750 MG Tab TAKE ONE TABLET BY MOUTH 4 TIMES DAILY  Qty: 60 tablet, Refills: 0      mupirocin calcium 2% nasl oint (BACTROBAN) 2 % Oint by Nasal route 2 (two) times daily. Apply to inside of both nostrils twice daily starting 48 hours before surgery and continuing for 5 days after surgery.    Topical ointment may be substituted if needed.  Qty: 1 Tube, Refills: 0    Associated Diagnoses: S/P spinal surgery      progesterone (PROMETRIUM) 200 MG capsule Take 200 mg by mouth every evening.                  Discharge Diagnosis: Cervical radiculopathy [M54.12]  Condition on Discharge: Stable with no complications to procedure   Diet on Discharge: Same as before.  Activity: as per instruction sheet.  Discharge to: Home with a responsible adult.  Follow up: 2-4 weeks       Please call my office or pager at [phone] if experienced any weakness or loss of sensation, fever > 101.5, pain uncontrolled with oral medications, persistent nausea/vomiting/or diarrhea, redness or drainage from the incisions, or any other worrisome concerns. If physician on call was  not reached or could not communicate with our office for any reason please go to the nearest emergency department

## 2020-03-16 NOTE — DISCHARGE INSTRUCTIONS

## 2020-03-16 NOTE — OP NOTE
Cervical Interlaminar Epidural Steroid Injection under fluoroscopic guidance.  Time-out taken to identify patient and procedure side prior to starting the procedure.   I attest that I have reviewed the patient's home medications prior to the procedure and no contraindication have been identified. I  re-evaluated the patient after the patient was positioned for the procedure in the procedure room immediately before the procedural time-out. The vital signs are current and represent the current state of the patient which has not significantly changed since the preprocedure assessment.                                                              Date of Service: 03/16/2020    PCP: Matt Vergara MD    Referring Physician:    PROCEDURE: C6/7 cervical interlaminar epidural steroid injection under fluoroscopy.  REASONS FOR PROCEDURE: Cervical radiculopathy [M54.12]  1. Cervical radiculopathy    2. S/P cervical disc replacement    3. Chronic pain syndrome      POSTOP DIAGNOSIS:  Cervical radiculopathy [M54.12]     1. Cervical radiculopathy    2. S/P cervical disc replacement    3. Chronic pain syndrome      PHYSICIAN: Gm Ortiz MD  ASSISTANTS: Adam Mederos M.D. [facility]         MEDICATIONS INJECTED:  Preservative-free dexamethasone 10mg and Xylocaine 1% MPF 1ml.  This was followed by a slow injection of 4 mL sterile, preservative-free normal saline.    LOCAL ANESTHETIC USED: Xylocaine 1% 9ml with Sodium Bicarbonate 1ml.     SEDATION MEDICATIONS: local/IV sedation: Versed 2 mg and fentanyl 50 mcg IV.  Conscious sedation ordered by MD.  Patient reevaluated and sedation administered by MD and monitored by RN.  Total sedation time was less than 10 min. (See nurse documentation and case log for sedation time)    COMPLICATIONS:  none.    ESTIMATED BLOOD LOSS: none.    TECHNIQUE:  With the patient laying in a prone position with the neck in a flexed forward position, the area was prepped and draped in the usual  sterile fashion using ChloraPrep and a fenestrated drape.  The area was determined under fluoroscopic guidance.  Local anesthetic was given using a 27-gauge needle by raising a wheal and going down to the osseous elements of the cervical spine.  A 3.5 inch 20-gauge Touhy needle was introduced under fluoroscopic guidance to meet the lamina of T1.  The needle was then hinged under the lamina then advanced using loss of resistance technique.  Once the tip of the needle was in the desired position, the contrast dye Omnipaque was injected to determine placement and no vascular runoff.  Digital subtraction was employed to confirm that there is no vascular runoff.  The steroid was then injected slowly followed by a slow injection of the 4 mL of the sterile preservative-free normal saline.  The patient tolerated the procedure well.    PAIN BEFORE THE PROCEDURE: 5/10    PAIN AFTER THE PROCEDURE: 0/10    The patient was monitored after the procedure and was given post-procedure and discharge instructions to follow at home. The patient was discharged in a stable condition

## 2020-03-16 NOTE — H&P
HPI  Patient presenting for Procedure(s) (LRB):  CERVICAL/THORACIC JANNETTE DIRECT REFERRAL (N/A)     Patient on Anti-coagulation No    No health changes since previous encounter    History reviewed. No pertinent past medical history.  Past Surgical History:   Procedure Laterality Date    EPIDURAL STEROID INJECTION N/A 2/13/2020    Procedure: CERVICAL/THORACIC JANNETTE DIRECT REFERRAL;  Surgeon: Gm Ortiz MD;  Location: [facility];  Service: Pain Management;  Laterality: N/A;  NEEDS CONSENT     Review of patient's allergies indicates:   Allergen Reactions    Latex, natural rubber       Current Facility-Administered Medications   Medication    0.9%  NaCl infusion       PMHx, PSHx, Allergies, Medications reviewed in epic    ROS negative except pain complaints in HPI    OBJECTIVE:    /71 (BP Location: Right arm, Patient Position: Sitting)   Pulse 84   Temp 98.3 °F (36.8 °C) (Oral)   Ht 5' (1.524 m)   Wt 49.4 kg (109 lb)   SpO2 99%   BMI 21.29 kg/m²     PHYSICAL EXAMINATION:    GENERAL: Well appearing, in no acute distress, alert and oriented x3.  PSYCH:  Mood and affect appropriate.  SKIN: Skin color, texture, turgor normal, no rashes or lesions which will impact the procedure.  CV: RRR with palpation of the radial artery.  PULM: No evidence of respiratory difficulty, symmetric chest rise. Clear to auscultation.  NEURO: Cranial nerves grossly intact.    Plan:    Proceed with procedure as planned Procedure(s) (LRB):  CERVICAL/THORACIC JANNETTE DIRECT REFERRAL (N/A)    Adam Mederos  03/16/2020        \

## (undated) DEVICE — DRESSING SURGICAL 1/2X1/2

## (undated) DEVICE — DRESSING LEUKOPLAST FLEX 1X3IN

## (undated) DEVICE — SUT SILK 2-0 BLK BR PS-2 18

## (undated) DEVICE — PACK SET UP CONVERTORS

## (undated) DEVICE — SEE MEDLINE ITEM 157150

## (undated) DEVICE — KIT SURGIFLO EVITHROM

## (undated) DEVICE — SUT MCRYL PLUS 4-0 PS2 27IN

## (undated) DEVICE — MARKER SKIN STND TIP BLUE BARR

## (undated) DEVICE — DIFFUSER

## (undated) DEVICE — CHLORAPREP 3ML APPLICATOR TINT

## (undated) DEVICE — NDL SPINAL 18GX3.5 SPINOCAN

## (undated) DEVICE — SEE MEDLINE ITEM 146292

## (undated) DEVICE — SEE MEDLINE ITEM 156905

## (undated) DEVICE — DRAPE THYROID WITH ARMBOARD

## (undated) DEVICE — DRESSING TELFA N ADH 3X8

## (undated) DEVICE — DRAPE OPMI STERILE

## (undated) DEVICE — DRESSING TRANS 4X4 TEGADERM

## (undated) DEVICE — SUT SILK 3-0 SH 18IN BLACK

## (undated) DEVICE — GAUZE SPONGE PEANUT STRL

## (undated) DEVICE — DRAPE C ARM 42 X 120 10/BX

## (undated) DEVICE — CARTRIDGE OIL

## (undated) DEVICE — Device

## (undated) DEVICE — EVACUATOR WOUND BULB 100CC

## (undated) DEVICE — BUR BONE CUT MICRO TPS 3X3.8MM

## (undated) DEVICE — CORD BIPOLAR 12 FOOT

## (undated) DEVICE — SUT VICRYL 3-0 27 SH

## (undated) DEVICE — ELECTRODE REM PLYHSV RETURN 9

## (undated) DEVICE — SUT MONOCRYL 5-0 P-3 UND 18

## (undated) DEVICE — DRAIN CHANNEL ROUND 10FR